# Patient Record
Sex: FEMALE | Race: BLACK OR AFRICAN AMERICAN | NOT HISPANIC OR LATINO | URBAN - METROPOLITAN AREA
[De-identification: names, ages, dates, MRNs, and addresses within clinical notes are randomized per-mention and may not be internally consistent; named-entity substitution may affect disease eponyms.]

---

## 2017-04-28 ENCOUNTER — INPATIENT (INPATIENT)
Facility: HOSPITAL | Age: 52
LOS: 4 days | Discharge: HOME | DRG: 203 | End: 2017-05-03
Attending: INTERNAL MEDICINE | Admitting: INTERNAL MEDICINE
Payer: MEDICARE

## 2017-04-28 VITALS
WEIGHT: 199.96 LBS | HEIGHT: 67 IN | RESPIRATION RATE: 25 BRPM | OXYGEN SATURATION: 100 % | SYSTOLIC BLOOD PRESSURE: 122 MMHG | HEART RATE: 90 BPM | DIASTOLIC BLOOD PRESSURE: 76 MMHG

## 2017-04-28 DIAGNOSIS — K52.9 NONINFECTIVE GASTROENTERITIS AND COLITIS, UNSPECIFIED: ICD-10-CM

## 2017-04-28 DIAGNOSIS — Z90.49 ACQUIRED ABSENCE OF OTHER SPECIFIED PARTS OF DIGESTIVE TRACT: Chronic | ICD-10-CM

## 2017-04-28 DIAGNOSIS — E87.6 HYPOKALEMIA: ICD-10-CM

## 2017-04-28 DIAGNOSIS — Z96.641 PRESENCE OF RIGHT ARTIFICIAL HIP JOINT: Chronic | ICD-10-CM

## 2017-04-28 DIAGNOSIS — Z29.9 ENCOUNTER FOR PROPHYLACTIC MEASURES, UNSPECIFIED: ICD-10-CM

## 2017-04-28 DIAGNOSIS — Z90.711 ACQUIRED ABSENCE OF UTERUS WITH REMAINING CERVICAL STUMP: Chronic | ICD-10-CM

## 2017-04-28 DIAGNOSIS — G89.29 OTHER CHRONIC PAIN: ICD-10-CM

## 2017-04-28 DIAGNOSIS — J45.51 SEVERE PERSISTENT ASTHMA WITH (ACUTE) EXACERBATION: ICD-10-CM

## 2017-04-28 DIAGNOSIS — M79.669 PAIN IN UNSPECIFIED LOWER LEG: ICD-10-CM

## 2017-04-28 LAB
ALBUMIN SERPL ELPH-MCNC: 3.6 G/DL — SIGNIFICANT CHANGE UP (ref 3.5–5)
ALP SERPL-CCNC: 132 U/L — HIGH (ref 40–120)
ALT FLD-CCNC: 14 U/L DA — SIGNIFICANT CHANGE UP (ref 10–60)
ANION GAP SERPL CALC-SCNC: 12 MMOL/L — SIGNIFICANT CHANGE UP (ref 5–17)
ANION GAP SERPL CALC-SCNC: 7 MMOL/L — SIGNIFICANT CHANGE UP (ref 5–17)
AST SERPL-CCNC: 11 U/L — SIGNIFICANT CHANGE UP (ref 10–40)
BASE EXCESS BLDA CALC-SCNC: -3.8 MMOL/L — LOW (ref -2–2)
BASOPHILS # BLD AUTO: 0.1 K/UL — SIGNIFICANT CHANGE UP (ref 0–0.2)
BASOPHILS NFR BLD AUTO: 1.1 % — SIGNIFICANT CHANGE UP (ref 0–2)
BILIRUB SERPL-MCNC: 1.1 MG/DL — SIGNIFICANT CHANGE UP (ref 0.2–1.2)
BLOOD GAS COMMENTS ARTERIAL: SIGNIFICANT CHANGE UP
BUN SERPL-MCNC: 10 MG/DL — SIGNIFICANT CHANGE UP (ref 7–18)
BUN SERPL-MCNC: 13 MG/DL — SIGNIFICANT CHANGE UP (ref 7–18)
CALCIUM SERPL-MCNC: 9 MG/DL — SIGNIFICANT CHANGE UP (ref 8.4–10.5)
CALCIUM SERPL-MCNC: 9.2 MG/DL — SIGNIFICANT CHANGE UP (ref 8.4–10.5)
CHLORIDE SERPL-SCNC: 108 MMOL/L — SIGNIFICANT CHANGE UP (ref 96–108)
CHLORIDE SERPL-SCNC: 111 MMOL/L — HIGH (ref 96–108)
CO2 SERPL-SCNC: 23 MMOL/L — SIGNIFICANT CHANGE UP (ref 22–31)
CO2 SERPL-SCNC: 23 MMOL/L — SIGNIFICANT CHANGE UP (ref 22–31)
CREAT SERPL-MCNC: 1.01 MG/DL — SIGNIFICANT CHANGE UP (ref 0.5–1.3)
CREAT SERPL-MCNC: 1.08 MG/DL — SIGNIFICANT CHANGE UP (ref 0.5–1.3)
EOSINOPHIL # BLD AUTO: 0.1 K/UL — SIGNIFICANT CHANGE UP (ref 0–0.5)
EOSINOPHIL NFR BLD AUTO: 1.9 % — SIGNIFICANT CHANGE UP (ref 0–6)
GLUCOSE SERPL-MCNC: 137 MG/DL — HIGH (ref 70–99)
GLUCOSE SERPL-MCNC: 151 MG/DL — HIGH (ref 70–99)
HCO3 BLDA-SCNC: 19 MMOL/L — LOW (ref 23–27)
HCT VFR BLD CALC: 38.3 % — SIGNIFICANT CHANGE UP (ref 34.5–45)
HGB BLD-MCNC: 12.8 G/DL — SIGNIFICANT CHANGE UP (ref 11.5–15.5)
HOROWITZ INDEX BLDA+IHG-RTO: 30 — SIGNIFICANT CHANGE UP
LYMPHOCYTES # BLD AUTO: 4.2 K/UL — HIGH (ref 1–3.3)
LYMPHOCYTES # BLD AUTO: 64.6 % — HIGH (ref 13–44)
MCHC RBC-ENTMCNC: 30.7 PG — SIGNIFICANT CHANGE UP (ref 27–34)
MCHC RBC-ENTMCNC: 33.5 GM/DL — SIGNIFICANT CHANGE UP (ref 32–36)
MCV RBC AUTO: 91.6 FL — SIGNIFICANT CHANGE UP (ref 80–100)
MONOCYTES # BLD AUTO: 0.4 K/UL — SIGNIFICANT CHANGE UP (ref 0–0.9)
MONOCYTES NFR BLD AUTO: 5.7 % — SIGNIFICANT CHANGE UP (ref 2–14)
NEUTROPHILS # BLD AUTO: 1.7 K/UL — LOW (ref 1.8–7.4)
NEUTROPHILS NFR BLD AUTO: 26.6 % — LOW (ref 43–77)
PCO2 BLDA: 28 MMHG — LOW (ref 32–46)
PH BLDA: 7.44 — SIGNIFICANT CHANGE UP (ref 7.35–7.45)
PLATELET # BLD AUTO: 330 K/UL — SIGNIFICANT CHANGE UP (ref 150–400)
PO2 BLDA: 249 MMHG — HIGH (ref 74–108)
POTASSIUM SERPL-MCNC: 2.6 MMOL/L — CRITICAL LOW (ref 3.5–5.3)
POTASSIUM SERPL-MCNC: 4.8 MMOL/L — SIGNIFICANT CHANGE UP (ref 3.5–5.3)
POTASSIUM SERPL-SCNC: 2.6 MMOL/L — CRITICAL LOW (ref 3.5–5.3)
POTASSIUM SERPL-SCNC: 4.8 MMOL/L — SIGNIFICANT CHANGE UP (ref 3.5–5.3)
PROT SERPL-MCNC: 7.3 G/DL — SIGNIFICANT CHANGE UP (ref 6–8.3)
RAPID RVP RESULT: SIGNIFICANT CHANGE UP
RBC # BLD: 4.18 M/UL — SIGNIFICANT CHANGE UP (ref 3.8–5.2)
RBC # FLD: 12.4 % — SIGNIFICANT CHANGE UP (ref 10.3–14.5)
SAO2 % BLDA: 100 % — HIGH (ref 92–96)
SODIUM SERPL-SCNC: 141 MMOL/L — SIGNIFICANT CHANGE UP (ref 135–145)
SODIUM SERPL-SCNC: 143 MMOL/L — SIGNIFICANT CHANGE UP (ref 135–145)
WBC # BLD: 6.5 K/UL — SIGNIFICANT CHANGE UP (ref 3.8–10.5)
WBC # FLD AUTO: 6.5 K/UL — SIGNIFICANT CHANGE UP (ref 3.8–10.5)

## 2017-04-28 PROCEDURE — 99291 CRITICAL CARE FIRST HOUR: CPT

## 2017-04-28 PROCEDURE — 71010: CPT | Mod: 26

## 2017-04-28 RX ORDER — IPRATROPIUM/ALBUTEROL SULFATE 18-103MCG
3 AEROSOL WITH ADAPTER (GRAM) INHALATION EVERY 6 HOURS
Qty: 0 | Refills: 0 | Status: DISCONTINUED | OUTPATIENT
Start: 2017-04-28 | End: 2017-04-30

## 2017-04-28 RX ORDER — SODIUM CHLORIDE 9 MG/ML
1000 INJECTION INTRAMUSCULAR; INTRAVENOUS; SUBCUTANEOUS
Qty: 0 | Refills: 0 | Status: DISCONTINUED | OUTPATIENT
Start: 2017-04-28 | End: 2017-04-29

## 2017-04-28 RX ORDER — LORATADINE 10 MG/1
10 TABLET ORAL DAILY
Qty: 0 | Refills: 0 | Status: DISCONTINUED | OUTPATIENT
Start: 2017-04-28 | End: 2017-05-03

## 2017-04-28 RX ORDER — FLUTICASONE PROPIONATE 50 MCG
1 SPRAY, SUSPENSION NASAL
Qty: 0 | Refills: 0 | Status: DISCONTINUED | OUTPATIENT
Start: 2017-04-28 | End: 2017-05-03

## 2017-04-28 RX ORDER — ALBUTEROL 90 UG/1
2.5 AEROSOL, METERED ORAL ONCE
Qty: 0 | Refills: 0 | Status: COMPLETED | OUTPATIENT
Start: 2017-04-28 | End: 2017-04-28

## 2017-04-28 RX ORDER — BUDESONIDE AND FORMOTEROL FUMARATE DIHYDRATE 160; 4.5 UG/1; UG/1
1 AEROSOL RESPIRATORY (INHALATION)
Qty: 0 | Refills: 0 | Status: DISCONTINUED | OUTPATIENT
Start: 2017-04-28 | End: 2017-05-03

## 2017-04-28 RX ORDER — ACETAMINOPHEN 500 MG
650 TABLET ORAL EVERY 6 HOURS
Qty: 0 | Refills: 0 | Status: DISCONTINUED | OUTPATIENT
Start: 2017-04-28 | End: 2017-05-03

## 2017-04-28 RX ORDER — GABAPENTIN 400 MG/1
200 CAPSULE ORAL THREE TIMES A DAY
Qty: 0 | Refills: 0 | Status: DISCONTINUED | OUTPATIENT
Start: 2017-04-28 | End: 2017-05-03

## 2017-04-28 RX ORDER — TIOTROPIUM BROMIDE 18 UG/1
1 CAPSULE ORAL; RESPIRATORY (INHALATION) DAILY
Qty: 0 | Refills: 0 | Status: DISCONTINUED | OUTPATIENT
Start: 2017-04-28 | End: 2017-05-03

## 2017-04-28 RX ORDER — MAGNESIUM SULFATE 500 MG/ML
2 VIAL (ML) INJECTION ONCE
Qty: 0 | Refills: 0 | Status: COMPLETED | OUTPATIENT
Start: 2017-04-28 | End: 2017-04-28

## 2017-04-28 RX ORDER — MONTELUKAST 4 MG/1
10 TABLET, CHEWABLE ORAL DAILY
Qty: 0 | Refills: 0 | Status: DISCONTINUED | OUTPATIENT
Start: 2017-04-28 | End: 2017-05-02

## 2017-04-28 RX ORDER — POTASSIUM CHLORIDE 20 MEQ
40 PACKET (EA) ORAL EVERY 4 HOURS
Qty: 0 | Refills: 0 | Status: COMPLETED | OUTPATIENT
Start: 2017-04-28 | End: 2017-04-28

## 2017-04-28 RX ORDER — KETAMINE HYDROCHLORIDE 100 MG/ML
80 INJECTION INTRAMUSCULAR; INTRAVENOUS ONCE
Qty: 0 | Refills: 0 | Status: DISCONTINUED | OUTPATIENT
Start: 2017-04-28 | End: 2017-04-28

## 2017-04-28 RX ORDER — POTASSIUM CHLORIDE 20 MEQ
10 PACKET (EA) ORAL
Qty: 0 | Refills: 0 | Status: COMPLETED | OUTPATIENT
Start: 2017-04-28 | End: 2017-04-28

## 2017-04-28 RX ORDER — ENOXAPARIN SODIUM 100 MG/ML
40 INJECTION SUBCUTANEOUS EVERY 24 HOURS
Qty: 0 | Refills: 0 | Status: DISCONTINUED | OUTPATIENT
Start: 2017-04-29 | End: 2017-05-03

## 2017-04-28 RX ORDER — ALBUTEROL 90 UG/1
1 AEROSOL, METERED ORAL EVERY 4 HOURS
Qty: 0 | Refills: 0 | Status: DISCONTINUED | OUTPATIENT
Start: 2017-04-28 | End: 2017-05-03

## 2017-04-28 RX ORDER — METHOCARBAMOL 500 MG/1
750 TABLET, FILM COATED ORAL THREE TIMES A DAY
Qty: 0 | Refills: 0 | Status: DISCONTINUED | OUTPATIENT
Start: 2017-04-28 | End: 2017-05-03

## 2017-04-28 RX ORDER — ONDANSETRON 8 MG/1
4 TABLET, FILM COATED ORAL EVERY 4 HOURS
Qty: 0 | Refills: 0 | Status: COMPLETED | OUTPATIENT
Start: 2017-04-28 | End: 2017-04-30

## 2017-04-28 RX ADMIN — KETAMINE HYDROCHLORIDE 80 MILLIGRAM(S): 100 INJECTION INTRAMUSCULAR; INTRAVENOUS at 13:13

## 2017-04-28 RX ADMIN — GABAPENTIN 200 MILLIGRAM(S): 400 CAPSULE ORAL at 21:58

## 2017-04-28 RX ADMIN — MONTELUKAST 10 MILLIGRAM(S): 4 TABLET, CHEWABLE ORAL at 16:01

## 2017-04-28 RX ADMIN — ALBUTEROL 2.5 MILLIGRAM(S): 90 AEROSOL, METERED ORAL at 13:01

## 2017-04-28 RX ADMIN — ALBUTEROL 2.5 MILLIGRAM(S): 90 AEROSOL, METERED ORAL at 13:06

## 2017-04-28 RX ADMIN — Medication 100 MILLIEQUIVALENT(S): at 17:00

## 2017-04-28 RX ADMIN — Medication 40 MILLIEQUIVALENT(S): at 17:39

## 2017-04-28 RX ADMIN — Medication 50 GRAM(S): at 13:00

## 2017-04-28 RX ADMIN — ALBUTEROL 2.5 MILLIGRAM(S): 90 AEROSOL, METERED ORAL at 13:13

## 2017-04-28 RX ADMIN — Medication 40 MILLIGRAM(S): at 21:58

## 2017-04-28 RX ADMIN — Medication 100 MILLIEQUIVALENT(S): at 18:00

## 2017-04-28 RX ADMIN — Medication 40 MILLIGRAM(S): at 14:05

## 2017-04-28 RX ADMIN — LORATADINE 10 MILLIGRAM(S): 10 TABLET ORAL at 17:39

## 2017-04-28 RX ADMIN — Medication 40 MILLIEQUIVALENT(S): at 21:58

## 2017-04-28 RX ADMIN — Medication 100 MILLIEQUIVALENT(S): at 16:00

## 2017-04-28 RX ADMIN — SODIUM CHLORIDE 75 MILLILITER(S): 9 INJECTION INTRAMUSCULAR; INTRAVENOUS; SUBCUTANEOUS at 17:40

## 2017-04-28 RX ADMIN — Medication 3 MILLILITER(S): at 21:48

## 2017-04-28 NOTE — ED ADULT NURSE NOTE - OBJECTIVE STATEMENT
Presented to ED for "asthma attack that started 20 minutes ago." AA&Ox3. Received on %. Received duoneb x3 from EMS. On cardiac monitor. IVHL gauge 18 placed by EMS to right AC.

## 2017-04-28 NOTE — H&P ADULT. - PROBLEM SELECTOR PLAN 1
- secondary to seasonal allergy   - s/p Duonebs*3, ketamine 80 once, mag sulphate 2mg iv once and BiPAP with I/E 12/6. Patient's peak flow at exam was 430ml. Her baseline is around 300ml.   - will continue Duonebs, solumedrol, Symbicort and Singulair   - patient saturating 99% with 2l NC; will continue for now, may taper as indicated  - f/u RVP  - will get blood cultures   - out patient PFTs indicate that patient developed stridor after methacholine inhalation during fifth stage of test and was subsequently transferred to ED. Otherwise, PFTs and flow cytometry is normal; most likely diagnosis is vocal cord dysfunction. - secondary to seasonal allergy   - s/p Duonebs*3, ketamine 80 once, mag sulphate 2mg iv once and BiPAP with I/E 12/6. Patient's peak flow at exam was 430ml. Her baseline is around 300ml.   - will admit to ICU and new Bipap was needed   - will continue Duonebs, solumedrol, Symbicort and Singulair   - patient saturating 99% with 2l NC; will continue for now, may taper as indicated  - f/u RVP  - will get blood cultures   - out patient PFTs indicate that patient developed stridor after methacholine inhalation during fifth stage of test and was subsequently transferred to ED. Otherwise, PFTs and flow cytometry is normal; most likely diagnosis is vocal cord dysfunction. - secondary to seasonal allergy   - s/p Duonebs*3, ketamine 80 once, mag sulphate 2mg iv once and BiPAP with I/E 12/6. ABG 7.44/28/249/19  - Patient's peak flow at exam was 430ml. Her baseline is around 300ml.   - will admit to ICU and new Bipap was needed   - will continue Duonebs, solumedrol, Symbicort and Singulair   - patient saturating 99% with 2l NC; will continue for now, may taper as indicated  - f/u RVP  - will get blood cultures   - out patient PFTs indicate that patient developed stridor after methacholine inhalation during fifth stage of test and was subsequently transferred to ED. Otherwise, PFTs and flow cytometry is normal; most likely diagnosis is vocal cord dysfunction.

## 2017-04-28 NOTE — H&P ADULT. - ASSESSMENT
51 year old female from home, with history of Asthma with multiple hospitalizations including intubation for 24 hours last year and Allergies, Chronic back pain, PSH of appendectomy, right hip surgery and hysterectomy, presented with acute onset of shortness of breath and wheezing.

## 2017-04-28 NOTE — H&P ADULT. - PSH
H/O abdominal supracervical subtotal hysterectomy    History of appendectomy    History of right hip replacement

## 2017-04-28 NOTE — ED PROVIDER NOTE - CRITICAL CARE INDICATION, MLM
patient was critically ill... Patient was critically ill with a high probability of imminent or life threatening deterioration. Patient unable to breath, required bipap and frequent bedside visits.

## 2017-04-28 NOTE — H&P ADULT. - ATTENDING COMMENTS
52 yo female with hx of asthma since childhood, presents with acute dyspnea, wheezing (per ED staff), acute respiratory failure requiring bipap.  On my exam she initially had an inspiratory wheeze mainly from the upper airway, no expiratory wheeze.  Will treat for asthma exacerbation with steroids, nebs, bipap, however I strongly suspect vocal cord dysfunction as the primary cause of her distress.

## 2017-04-28 NOTE — H&P ADULT. - MUSCULOSKELETAL
details… detailed exam calf tenderness/no joint swelling/no joint warmth/ROM intact/normal/no joint erythema

## 2017-04-28 NOTE — H&P ADULT. - HISTORY OF PRESENT ILLNESS
51 year old female with history of asthma with prior intubation, right hip replacement, appendectomy, hysterectomy, multiple food and drug allergies, presented to the ED with acute respiratory distress, severe inspiratory and expiratory wheezing per the ED attending.  She reports for past 2 days her asthma has been "acting up" with a low grade fever and URI symptoms 2 days ago.  Today while at a doctors appointment at her orthopedist she became acutely short of breath, wheezing, and was sent to the ED. 51 year old female with history of asthma with prior intubation, right hip replacement, appendectomy, hysterectomy, multiple food and drug allergies, presented to the ED with acute respiratory distress, severe inspiratory and expiratory wheezing per the ED attending.  She reports for past 2 days her asthma has been "acting up" with a low grade fever and URI symptoms 2 days ago.  Symptoms also accompanied by cough and mild sputum production.  Today while at a doctors appointment at her orthopedist she became acutely short of breath, wheezing, and was sent to the ED.  En route she was given solu-medrol, nebs, SC epi.  Upon arrival she was given continuous albuterol nebs, magnesium, ketamine, and placed on BIPAP.   Pt now reports feeling better. 51 year old female from home, with history of Asthma with multiple hospitalizations including intubation for 24 hours last year and Allergies, Chronic back pain, PSH of appendectomy, right hip surgery and hysterectomy, presented with acute onset of shortness of breath and wheezing.     Patient had moderate persistent asthma since age 10 years requiring 1-2 puffs of inhalors day and no night time symptoms. 2 years ago she was admitted for anaphylactic shock and discovered to have several allergies to meat, nuts, eggs, shell fish, etc. Also her Asthma became severe persistent with frequent night time syptoms and multiple hospital admissions. She was placed on and off on iv steroids.Last year intubation at [facility] was difficult as her throat was closing and she was extubated next day, was told she cannot be intubated again should have tracheostomy if I  V is needed.      ppendectomy, hysterectomy, multiple food and drug allergies, presented to the ED with acute respiratory distress, severe inspiratory and expiratory wheezing per the ED attending.  She reports for past 2 days her asthma has been "acting up" with a low grade fever and URI symptoms 2 days ago.  Symptoms also accompanied by cough and mild sputum production.  Today while at a doctors appointment at her orthopedist she became acutely short of breath, wheezing, and was sent to the ED.  En route she was given solu-medrol, nebs, SC epi.  Upon arrival she was given continuous albuterol nebs, magnesium, ketamine, and placed on BIPAP.   Pt now reports feeling better. 51 year old female from home, with history of Asthma with multiple hospitalizations including intubation for 24 hours last year and Allergies, Chronic back pain, PSH of appendectomy, right hip surgery and hysterectomy, presented with acute onset of shortness of breath and wheezing.     Patient had moderate persistent asthma since age 10 years requiring 1-2 puffs of inhalors day and no night time symptoms. 2 years ago she was admitted for anaphylactic shock and discovered to have several allergies to meat, nuts, eggs, shell fish, etc. Also her Asthma became severe persistent with frequent night time syptoms and multiple hospital admissions. She was placed on and off on iv steroids.Last year intubation at [facility] was difficult as her throat was closing and she was extubated next day, was told she cannot be intubated again should have tracheostomy if Invasive ventilation is needed. 2 months ago had PFTs at [facility] and found to have an "upper airway condition' something in throat'. She was tapered off steroids about 2 months ago and inhaled steriods was recommended. Despite PMD's efforts in getting insurance to pay, she couldn't get the same and has been on inhalors and nebulisers only.  2 days ago she noticed windows of her room were open and she could smell fresh cut grass that initiated the asthma attack. Also has cough, shortness of breath wheeze, sputum (clear), feeling warm/no fever, headache, nasal congestion and left sided pleuritic chest pain. She was at her orthopedist's office from where she was sent to ED for SOB and wheeze.   She was having "greenish stools" for which PMD told her to take low fibre. Since yesterday she has been having nausea, vomiting and diarrhea. Describes vomitus as anything that she eats and sometimes "clear color with streaks of blood". She had 3 watery BMs yesterday. Denies any abdominal pain, weight loss, recent sick contact/travel or urinary problems. Due to allergies she cannot get Flu shot.   Lifetime non smoker, no passive smoking.      In the ED, she was given continuous albuterol nebs, magnesium, ketamine, and placed on BIPAP with significant improvement of symptoms.

## 2017-04-28 NOTE — H&P ADULT. - PROBLEM SELECTOR PLAN 2
- most likley viral gastroenteritis  - she received only on antibiotic 2 months ago during a hospital visit; no abdominal tenderness hence will not get order C diff   - iv fluids and electrolyte replacement   -continue Zofran as needed - most likely viral gastroenteritis  - she received only on antibiotic 2 months ago during a hospital visit; no abdominal tenderness hence will not get order C diff   - iv fluids and electrolyte replacement   -continue Zofran as needed

## 2017-04-28 NOTE — H&P ADULT. - PROBLEM SELECTOR PLAN 5
- IMPROVE VTE score is 0   - DVT ppx as patient might be bedridden during hospital stay - patient reports taking only TYlenol and motrin as needed for pain   - will hold Motrin for recent blood streaked sputum   - will continue methocarbamol and gabapentin   - will continue Tylenol

## 2017-04-28 NOTE — H&P ADULT. - PROBLEM SELECTOR PLAN 4
- right calf tenderenss   - Nathanael's sign neg  - Wells score for DVT 1; low/unlikley hence will not order further tests - right calf tenderness   - Nathanael's sign neg  - Wells score for DVT 1; low/unlikley hence will not order further tests

## 2017-04-29 LAB
ALBUMIN SERPL ELPH-MCNC: 3.3 G/DL — LOW (ref 3.5–5)
ALP SERPL-CCNC: 119 U/L — SIGNIFICANT CHANGE UP (ref 40–120)
ALT FLD-CCNC: 14 U/L DA — SIGNIFICANT CHANGE UP (ref 10–60)
ANION GAP SERPL CALC-SCNC: 6 MMOL/L — SIGNIFICANT CHANGE UP (ref 5–17)
AST SERPL-CCNC: 8 U/L — LOW (ref 10–40)
BASOPHILS # BLD AUTO: 0 K/UL — SIGNIFICANT CHANGE UP (ref 0–0.2)
BASOPHILS NFR BLD AUTO: 0.2 % — SIGNIFICANT CHANGE UP (ref 0–2)
BILIRUB SERPL-MCNC: 0.6 MG/DL — SIGNIFICANT CHANGE UP (ref 0.2–1.2)
BUN SERPL-MCNC: 10 MG/DL — SIGNIFICANT CHANGE UP (ref 7–18)
CALCIUM SERPL-MCNC: 9.3 MG/DL — SIGNIFICANT CHANGE UP (ref 8.4–10.5)
CHLORIDE SERPL-SCNC: 110 MMOL/L — HIGH (ref 96–108)
CO2 SERPL-SCNC: 25 MMOL/L — SIGNIFICANT CHANGE UP (ref 22–31)
CREAT SERPL-MCNC: 0.83 MG/DL — SIGNIFICANT CHANGE UP (ref 0.5–1.3)
EOSINOPHIL # BLD AUTO: 0 K/UL — SIGNIFICANT CHANGE UP (ref 0–0.5)
EOSINOPHIL NFR BLD AUTO: 0 % — SIGNIFICANT CHANGE UP (ref 0–6)
GLUCOSE SERPL-MCNC: 133 MG/DL — HIGH (ref 70–99)
HCT VFR BLD CALC: 36 % — SIGNIFICANT CHANGE UP (ref 34.5–45)
HGB BLD-MCNC: 11.7 G/DL — SIGNIFICANT CHANGE UP (ref 11.5–15.5)
LYMPHOCYTES # BLD AUTO: 1.5 K/UL — SIGNIFICANT CHANGE UP (ref 1–3.3)
LYMPHOCYTES # BLD AUTO: 15.8 % — SIGNIFICANT CHANGE UP (ref 13–44)
MAGNESIUM SERPL-MCNC: 2.4 MG/DL — SIGNIFICANT CHANGE UP (ref 1.8–2.4)
MCHC RBC-ENTMCNC: 30.4 PG — SIGNIFICANT CHANGE UP (ref 27–34)
MCHC RBC-ENTMCNC: 32.5 GM/DL — SIGNIFICANT CHANGE UP (ref 32–36)
MCV RBC AUTO: 93.3 FL — SIGNIFICANT CHANGE UP (ref 80–100)
MONOCYTES # BLD AUTO: 0.1 K/UL — SIGNIFICANT CHANGE UP (ref 0–0.9)
MONOCYTES NFR BLD AUTO: 0.9 % — LOW (ref 2–14)
NEUTROPHILS # BLD AUTO: 7.8 K/UL — HIGH (ref 1.8–7.4)
NEUTROPHILS NFR BLD AUTO: 83 % — HIGH (ref 43–77)
PHOSPHATE SERPL-MCNC: 3.4 MG/DL — SIGNIFICANT CHANGE UP (ref 2.5–4.5)
PLATELET # BLD AUTO: 320 K/UL — SIGNIFICANT CHANGE UP (ref 150–400)
POTASSIUM SERPL-MCNC: 4.9 MMOL/L — SIGNIFICANT CHANGE UP (ref 3.5–5.3)
POTASSIUM SERPL-SCNC: 4.9 MMOL/L — SIGNIFICANT CHANGE UP (ref 3.5–5.3)
PROT SERPL-MCNC: 7.2 G/DL — SIGNIFICANT CHANGE UP (ref 6–8.3)
RBC # BLD: 3.86 M/UL — SIGNIFICANT CHANGE UP (ref 3.8–5.2)
RBC # FLD: 12.8 % — SIGNIFICANT CHANGE UP (ref 10.3–14.5)
SODIUM SERPL-SCNC: 141 MMOL/L — SIGNIFICANT CHANGE UP (ref 135–145)
WBC # BLD: 9.4 K/UL — SIGNIFICANT CHANGE UP (ref 3.8–10.5)
WBC # FLD AUTO: 9.4 K/UL — SIGNIFICANT CHANGE UP (ref 3.8–10.5)

## 2017-04-29 RX ORDER — BENZOCAINE AND MENTHOL 5; 1 G/100ML; G/100ML
1 LIQUID ORAL THREE TIMES A DAY
Qty: 0 | Refills: 0 | Status: DISCONTINUED | OUTPATIENT
Start: 2017-04-29 | End: 2017-05-03

## 2017-04-29 RX ADMIN — BENZOCAINE AND MENTHOL 1 LOZENGE: 5; 1 LIQUID ORAL at 21:45

## 2017-04-29 RX ADMIN — Medication 40 MILLIGRAM(S): at 05:39

## 2017-04-29 RX ADMIN — BUDESONIDE AND FORMOTEROL FUMARATE DIHYDRATE 1 PUFF(S): 160; 4.5 AEROSOL RESPIRATORY (INHALATION) at 01:10

## 2017-04-29 RX ADMIN — Medication 40 MILLIGRAM(S): at 21:50

## 2017-04-29 RX ADMIN — ENOXAPARIN SODIUM 40 MILLIGRAM(S): 100 INJECTION SUBCUTANEOUS at 01:06

## 2017-04-29 RX ADMIN — Medication 1 SPRAY(S): at 19:05

## 2017-04-29 RX ADMIN — METHOCARBAMOL 750 MILLIGRAM(S): 500 TABLET, FILM COATED ORAL at 14:20

## 2017-04-29 RX ADMIN — GABAPENTIN 200 MILLIGRAM(S): 400 CAPSULE ORAL at 15:13

## 2017-04-29 RX ADMIN — Medication 40 MILLIGRAM(S): at 14:22

## 2017-04-29 RX ADMIN — Medication 3 MILLILITER(S): at 08:19

## 2017-04-29 RX ADMIN — Medication 3 MILLILITER(S): at 20:14

## 2017-04-29 RX ADMIN — MONTELUKAST 10 MILLIGRAM(S): 4 TABLET, CHEWABLE ORAL at 14:22

## 2017-04-29 RX ADMIN — METHOCARBAMOL 750 MILLIGRAM(S): 500 TABLET, FILM COATED ORAL at 05:38

## 2017-04-29 RX ADMIN — METHOCARBAMOL 750 MILLIGRAM(S): 500 TABLET, FILM COATED ORAL at 01:05

## 2017-04-29 RX ADMIN — Medication 1 SPRAY(S): at 05:39

## 2017-04-29 RX ADMIN — GABAPENTIN 200 MILLIGRAM(S): 400 CAPSULE ORAL at 05:38

## 2017-04-29 RX ADMIN — SODIUM CHLORIDE 75 MILLILITER(S): 9 INJECTION INTRAMUSCULAR; INTRAVENOUS; SUBCUTANEOUS at 01:10

## 2017-04-29 RX ADMIN — BUDESONIDE AND FORMOTEROL FUMARATE DIHYDRATE 1 PUFF(S): 160; 4.5 AEROSOL RESPIRATORY (INHALATION) at 21:46

## 2017-04-29 RX ADMIN — Medication 3 MILLILITER(S): at 14:38

## 2017-04-29 RX ADMIN — GABAPENTIN 200 MILLIGRAM(S): 400 CAPSULE ORAL at 21:45

## 2017-04-29 RX ADMIN — LORATADINE 10 MILLIGRAM(S): 10 TABLET ORAL at 15:09

## 2017-04-29 RX ADMIN — Medication 3 MILLILITER(S): at 03:01

## 2017-04-29 RX ADMIN — METHOCARBAMOL 750 MILLIGRAM(S): 500 TABLET, FILM COATED ORAL at 21:46

## 2017-04-29 RX ADMIN — BUDESONIDE AND FORMOTEROL FUMARATE DIHYDRATE 1 PUFF(S): 160; 4.5 AEROSOL RESPIRATORY (INHALATION) at 15:11

## 2017-04-30 LAB
ALBUMIN SERPL ELPH-MCNC: 3.3 G/DL — LOW (ref 3.5–5)
ALP SERPL-CCNC: 112 U/L — SIGNIFICANT CHANGE UP (ref 40–120)
ALT FLD-CCNC: 14 U/L DA — SIGNIFICANT CHANGE UP (ref 10–60)
ANION GAP SERPL CALC-SCNC: 5 MMOL/L — SIGNIFICANT CHANGE UP (ref 5–17)
AST SERPL-CCNC: 9 U/L — LOW (ref 10–40)
BILIRUB SERPL-MCNC: 0.5 MG/DL — SIGNIFICANT CHANGE UP (ref 0.2–1.2)
BUN SERPL-MCNC: 17 MG/DL — SIGNIFICANT CHANGE UP (ref 7–18)
CALCIUM SERPL-MCNC: 9.1 MG/DL — SIGNIFICANT CHANGE UP (ref 8.4–10.5)
CHLORIDE SERPL-SCNC: 109 MMOL/L — HIGH (ref 96–108)
CO2 SERPL-SCNC: 27 MMOL/L — SIGNIFICANT CHANGE UP (ref 22–31)
CREAT SERPL-MCNC: 0.94 MG/DL — SIGNIFICANT CHANGE UP (ref 0.5–1.3)
GLUCOSE SERPL-MCNC: 129 MG/DL — HIGH (ref 70–99)
HCT VFR BLD CALC: 33.5 % — LOW (ref 34.5–45)
HGB BLD-MCNC: 11.3 G/DL — LOW (ref 11.5–15.5)
IGE SERPL-ACNC: 508 IU/ML — HIGH (ref 0–100)
MAGNESIUM SERPL-MCNC: 2.4 MG/DL — SIGNIFICANT CHANGE UP (ref 1.8–2.4)
MCHC RBC-ENTMCNC: 31.3 PG — SIGNIFICANT CHANGE UP (ref 27–34)
MCHC RBC-ENTMCNC: 33.7 GM/DL — SIGNIFICANT CHANGE UP (ref 32–36)
MCV RBC AUTO: 93 FL — SIGNIFICANT CHANGE UP (ref 80–100)
PHOSPHATE SERPL-MCNC: 3.6 MG/DL — SIGNIFICANT CHANGE UP (ref 2.5–4.5)
PLATELET # BLD AUTO: 272 K/UL — SIGNIFICANT CHANGE UP (ref 150–400)
POTASSIUM SERPL-MCNC: 5 MMOL/L — SIGNIFICANT CHANGE UP (ref 3.5–5.3)
POTASSIUM SERPL-SCNC: 5 MMOL/L — SIGNIFICANT CHANGE UP (ref 3.5–5.3)
PROT SERPL-MCNC: 7.1 G/DL — SIGNIFICANT CHANGE UP (ref 6–8.3)
RBC # BLD: 3.6 M/UL — LOW (ref 3.8–5.2)
RBC # FLD: 13.3 % — SIGNIFICANT CHANGE UP (ref 10.3–14.5)
SODIUM SERPL-SCNC: 141 MMOL/L — SIGNIFICANT CHANGE UP (ref 135–145)
WBC # BLD: 11.4 K/UL — HIGH (ref 3.8–10.5)
WBC # FLD AUTO: 11.4 K/UL — HIGH (ref 3.8–10.5)

## 2017-04-30 PROCEDURE — 71010: CPT | Mod: 26

## 2017-04-30 PROCEDURE — 99232 SBSQ HOSP IP/OBS MODERATE 35: CPT | Mod: GC

## 2017-04-30 RX ORDER — IPRATROPIUM/ALBUTEROL SULFATE 18-103MCG
3 AEROSOL WITH ADAPTER (GRAM) INHALATION ONCE
Qty: 0 | Refills: 0 | Status: COMPLETED | OUTPATIENT
Start: 2017-04-30 | End: 2017-04-30

## 2017-04-30 RX ORDER — MAGNESIUM SULFATE 500 MG/ML
2 VIAL (ML) INJECTION ONCE
Qty: 0 | Refills: 0 | Status: COMPLETED | OUTPATIENT
Start: 2017-04-30 | End: 2017-04-30

## 2017-04-30 RX ORDER — IPRATROPIUM/ALBUTEROL SULFATE 18-103MCG
3 AEROSOL WITH ADAPTER (GRAM) INHALATION EVERY 4 HOURS
Qty: 0 | Refills: 0 | Status: DISCONTINUED | OUTPATIENT
Start: 2017-04-30 | End: 2017-05-03

## 2017-04-30 RX ADMIN — BENZOCAINE AND MENTHOL 1 LOZENGE: 5; 1 LIQUID ORAL at 12:52

## 2017-04-30 RX ADMIN — Medication 40 MILLIGRAM(S): at 18:56

## 2017-04-30 RX ADMIN — LORATADINE 10 MILLIGRAM(S): 10 TABLET ORAL at 12:52

## 2017-04-30 RX ADMIN — GABAPENTIN 200 MILLIGRAM(S): 400 CAPSULE ORAL at 12:52

## 2017-04-30 RX ADMIN — Medication 3 MILLILITER(S): at 21:03

## 2017-04-30 RX ADMIN — Medication 3 MILLILITER(S): at 17:45

## 2017-04-30 RX ADMIN — Medication 1 SPRAY(S): at 18:57

## 2017-04-30 RX ADMIN — Medication 40 MILLIGRAM(S): at 12:53

## 2017-04-30 RX ADMIN — METHOCARBAMOL 750 MILLIGRAM(S): 500 TABLET, FILM COATED ORAL at 12:53

## 2017-04-30 RX ADMIN — Medication 3 MILLILITER(S): at 02:15

## 2017-04-30 RX ADMIN — METHOCARBAMOL 750 MILLIGRAM(S): 500 TABLET, FILM COATED ORAL at 06:33

## 2017-04-30 RX ADMIN — Medication 1 SPRAY(S): at 06:32

## 2017-04-30 RX ADMIN — BENZOCAINE AND MENTHOL 1 LOZENGE: 5; 1 LIQUID ORAL at 06:35

## 2017-04-30 RX ADMIN — BUDESONIDE AND FORMOTEROL FUMARATE DIHYDRATE 1 PUFF(S): 160; 4.5 AEROSOL RESPIRATORY (INHALATION) at 22:27

## 2017-04-30 RX ADMIN — ENOXAPARIN SODIUM 40 MILLIGRAM(S): 100 INJECTION SUBCUTANEOUS at 00:13

## 2017-04-30 RX ADMIN — Medication 3 MILLILITER(S): at 08:39

## 2017-04-30 RX ADMIN — Medication 3 MILLILITER(S): at 08:38

## 2017-04-30 RX ADMIN — MONTELUKAST 10 MILLIGRAM(S): 4 TABLET, CHEWABLE ORAL at 12:52

## 2017-04-30 RX ADMIN — ONDANSETRON 4 MILLIGRAM(S): 8 TABLET, FILM COATED ORAL at 07:14

## 2017-04-30 RX ADMIN — Medication 40 MILLIGRAM(S): at 06:32

## 2017-04-30 RX ADMIN — GABAPENTIN 200 MILLIGRAM(S): 400 CAPSULE ORAL at 06:33

## 2017-04-30 RX ADMIN — Medication 3 MILLILITER(S): at 14:09

## 2017-04-30 RX ADMIN — BUDESONIDE AND FORMOTEROL FUMARATE DIHYDRATE 1 PUFF(S): 160; 4.5 AEROSOL RESPIRATORY (INHALATION) at 09:50

## 2017-05-01 LAB
ALBUMIN SERPL ELPH-MCNC: 3.5 G/DL — SIGNIFICANT CHANGE UP (ref 3.5–5)
ALP SERPL-CCNC: 104 U/L — SIGNIFICANT CHANGE UP (ref 40–120)
ALT FLD-CCNC: 17 U/L DA — SIGNIFICANT CHANGE UP (ref 10–60)
ANION GAP SERPL CALC-SCNC: 7 MMOL/L — SIGNIFICANT CHANGE UP (ref 5–17)
AST SERPL-CCNC: 8 U/L — LOW (ref 10–40)
BILIRUB SERPL-MCNC: 0.4 MG/DL — SIGNIFICANT CHANGE UP (ref 0.2–1.2)
BUN SERPL-MCNC: 18 MG/DL — SIGNIFICANT CHANGE UP (ref 7–18)
CALCIUM SERPL-MCNC: 9 MG/DL — SIGNIFICANT CHANGE UP (ref 8.4–10.5)
CHLORIDE SERPL-SCNC: 104 MMOL/L — SIGNIFICANT CHANGE UP (ref 96–108)
CO2 SERPL-SCNC: 30 MMOL/L — SIGNIFICANT CHANGE UP (ref 22–31)
CREAT SERPL-MCNC: 1.09 MG/DL — SIGNIFICANT CHANGE UP (ref 0.5–1.3)
GLUCOSE SERPL-MCNC: 89 MG/DL — SIGNIFICANT CHANGE UP (ref 70–99)
HCT VFR BLD CALC: 37.1 % — SIGNIFICANT CHANGE UP (ref 34.5–45)
HGB BLD-MCNC: 11.6 G/DL — SIGNIFICANT CHANGE UP (ref 11.5–15.5)
MAGNESIUM SERPL-MCNC: 2.5 MG/DL — HIGH (ref 1.8–2.4)
MCHC RBC-ENTMCNC: 30.3 PG — SIGNIFICANT CHANGE UP (ref 27–34)
MCHC RBC-ENTMCNC: 31.4 GM/DL — LOW (ref 32–36)
MCV RBC AUTO: 96.6 FL — SIGNIFICANT CHANGE UP (ref 80–100)
PHOSPHATE SERPL-MCNC: 3.5 MG/DL — SIGNIFICANT CHANGE UP (ref 2.5–4.5)
PLATELET # BLD AUTO: 281 K/UL — SIGNIFICANT CHANGE UP (ref 150–400)
POTASSIUM SERPL-MCNC: 4.2 MMOL/L — SIGNIFICANT CHANGE UP (ref 3.5–5.3)
POTASSIUM SERPL-SCNC: 4.2 MMOL/L — SIGNIFICANT CHANGE UP (ref 3.5–5.3)
PROT SERPL-MCNC: 7.4 G/DL — SIGNIFICANT CHANGE UP (ref 6–8.3)
RBC # BLD: 3.84 M/UL — SIGNIFICANT CHANGE UP (ref 3.8–5.2)
RBC # FLD: 13.4 % — SIGNIFICANT CHANGE UP (ref 10.3–14.5)
SODIUM SERPL-SCNC: 141 MMOL/L — SIGNIFICANT CHANGE UP (ref 135–145)
WBC # BLD: 11 K/UL — HIGH (ref 3.8–10.5)
WBC # FLD AUTO: 11 K/UL — HIGH (ref 3.8–10.5)

## 2017-05-01 PROCEDURE — 99232 SBSQ HOSP IP/OBS MODERATE 35: CPT | Mod: GC

## 2017-05-01 RX ORDER — PANTOPRAZOLE SODIUM 20 MG/1
40 TABLET, DELAYED RELEASE ORAL
Qty: 0 | Refills: 0 | Status: DISCONTINUED | OUTPATIENT
Start: 2017-05-01 | End: 2017-05-03

## 2017-05-01 RX ORDER — SENNA PLUS 8.6 MG/1
2 TABLET ORAL AT BEDTIME
Qty: 0 | Refills: 0 | Status: DISCONTINUED | OUTPATIENT
Start: 2017-05-01 | End: 2017-05-03

## 2017-05-01 RX ORDER — DOCUSATE SODIUM 100 MG
100 CAPSULE ORAL
Qty: 0 | Refills: 0 | Status: DISCONTINUED | OUTPATIENT
Start: 2017-05-01 | End: 2017-05-03

## 2017-05-01 RX ADMIN — Medication 40 MILLIGRAM(S): at 06:23

## 2017-05-01 RX ADMIN — Medication 3 MILLILITER(S): at 14:06

## 2017-05-01 RX ADMIN — Medication 3 MILLILITER(S): at 09:05

## 2017-05-01 RX ADMIN — ENOXAPARIN SODIUM 40 MILLIGRAM(S): 100 INJECTION SUBCUTANEOUS at 00:27

## 2017-05-01 RX ADMIN — BENZOCAINE AND MENTHOL 1 LOZENGE: 5; 1 LIQUID ORAL at 12:54

## 2017-05-01 RX ADMIN — Medication 1 SPRAY(S): at 18:38

## 2017-05-01 RX ADMIN — MONTELUKAST 10 MILLIGRAM(S): 4 TABLET, CHEWABLE ORAL at 12:52

## 2017-05-01 RX ADMIN — GABAPENTIN 200 MILLIGRAM(S): 400 CAPSULE ORAL at 12:51

## 2017-05-01 RX ADMIN — Medication 3 MILLILITER(S): at 17:42

## 2017-05-01 RX ADMIN — Medication 3 MILLILITER(S): at 06:06

## 2017-05-01 RX ADMIN — METHOCARBAMOL 750 MILLIGRAM(S): 500 TABLET, FILM COATED ORAL at 12:52

## 2017-05-01 RX ADMIN — Medication 100 MILLIGRAM(S): at 18:37

## 2017-05-01 RX ADMIN — Medication 40 MILLIGRAM(S): at 18:37

## 2017-05-01 RX ADMIN — SENNA PLUS 2 TABLET(S): 8.6 TABLET ORAL at 21:43

## 2017-05-01 RX ADMIN — Medication 1 ENEMA: at 11:58

## 2017-05-01 RX ADMIN — BUDESONIDE AND FORMOTEROL FUMARATE DIHYDRATE 1 PUFF(S): 160; 4.5 AEROSOL RESPIRATORY (INHALATION) at 21:44

## 2017-05-01 RX ADMIN — Medication 1 SPRAY(S): at 06:22

## 2017-05-01 RX ADMIN — GABAPENTIN 200 MILLIGRAM(S): 400 CAPSULE ORAL at 21:43

## 2017-05-01 RX ADMIN — Medication 3 MILLILITER(S): at 22:23

## 2017-05-01 RX ADMIN — LORATADINE 10 MILLIGRAM(S): 10 TABLET ORAL at 12:51

## 2017-05-01 RX ADMIN — BUDESONIDE AND FORMOTEROL FUMARATE DIHYDRATE 1 PUFF(S): 160; 4.5 AEROSOL RESPIRATORY (INHALATION) at 12:01

## 2017-05-01 RX ADMIN — METHOCARBAMOL 750 MILLIGRAM(S): 500 TABLET, FILM COATED ORAL at 21:43

## 2017-05-02 LAB
ANION GAP SERPL CALC-SCNC: 6 MMOL/L — SIGNIFICANT CHANGE UP (ref 5–17)
BASOPHILS # BLD AUTO: 0.1 K/UL — SIGNIFICANT CHANGE UP (ref 0–0.2)
BASOPHILS NFR BLD AUTO: 0.6 % — SIGNIFICANT CHANGE UP (ref 0–2)
BUN SERPL-MCNC: 25 MG/DL — HIGH (ref 7–18)
CALCIUM SERPL-MCNC: 8.6 MG/DL — SIGNIFICANT CHANGE UP (ref 8.4–10.5)
CHLORIDE SERPL-SCNC: 105 MMOL/L — SIGNIFICANT CHANGE UP (ref 96–108)
CO2 SERPL-SCNC: 30 MMOL/L — SIGNIFICANT CHANGE UP (ref 22–31)
CREAT SERPL-MCNC: 1.05 MG/DL — SIGNIFICANT CHANGE UP (ref 0.5–1.3)
EOSINOPHIL # BLD AUTO: 0 K/UL — SIGNIFICANT CHANGE UP (ref 0–0.5)
EOSINOPHIL NFR BLD AUTO: 0 % — SIGNIFICANT CHANGE UP (ref 0–6)
GLUCOSE SERPL-MCNC: 92 MG/DL — SIGNIFICANT CHANGE UP (ref 70–99)
HCT VFR BLD CALC: 35.6 % — SIGNIFICANT CHANGE UP (ref 34.5–45)
HGB BLD-MCNC: 11.7 G/DL — SIGNIFICANT CHANGE UP (ref 11.5–15.5)
IGE SERPL-ACNC: 656 IU/ML — HIGH (ref 0–100)
LYMPHOCYTES # BLD AUTO: 2.8 K/UL — SIGNIFICANT CHANGE UP (ref 1–3.3)
LYMPHOCYTES # BLD AUTO: 30.4 % — SIGNIFICANT CHANGE UP (ref 13–44)
MCHC RBC-ENTMCNC: 30.9 PG — SIGNIFICANT CHANGE UP (ref 27–34)
MCHC RBC-ENTMCNC: 32.9 GM/DL — SIGNIFICANT CHANGE UP (ref 32–36)
MCV RBC AUTO: 93.9 FL — SIGNIFICANT CHANGE UP (ref 80–100)
MONOCYTES # BLD AUTO: 0.4 K/UL — SIGNIFICANT CHANGE UP (ref 0–0.9)
MONOCYTES NFR BLD AUTO: 4.9 % — SIGNIFICANT CHANGE UP (ref 2–14)
NEUTROPHILS # BLD AUTO: 5.8 K/UL — SIGNIFICANT CHANGE UP (ref 1.8–7.4)
NEUTROPHILS NFR BLD AUTO: 64.1 % — SIGNIFICANT CHANGE UP (ref 43–77)
PLATELET # BLD AUTO: 271 K/UL — SIGNIFICANT CHANGE UP (ref 150–400)
POTASSIUM SERPL-MCNC: 4.7 MMOL/L — SIGNIFICANT CHANGE UP (ref 3.5–5.3)
POTASSIUM SERPL-SCNC: 4.7 MMOL/L — SIGNIFICANT CHANGE UP (ref 3.5–5.3)
RBC # BLD: 3.8 M/UL — SIGNIFICANT CHANGE UP (ref 3.8–5.2)
RBC # FLD: 13 % — SIGNIFICANT CHANGE UP (ref 10.3–14.5)
SODIUM SERPL-SCNC: 141 MMOL/L — SIGNIFICANT CHANGE UP (ref 135–145)
WBC # BLD: 9.1 K/UL — SIGNIFICANT CHANGE UP (ref 3.8–10.5)
WBC # FLD AUTO: 9.1 K/UL — SIGNIFICANT CHANGE UP (ref 3.8–10.5)

## 2017-05-02 PROCEDURE — 99232 SBSQ HOSP IP/OBS MODERATE 35: CPT

## 2017-05-02 PROCEDURE — 71010: CPT | Mod: 26

## 2017-05-02 RX ORDER — MONTELUKAST 4 MG/1
10 TABLET, CHEWABLE ORAL AT BEDTIME
Qty: 0 | Refills: 0 | Status: DISCONTINUED | OUTPATIENT
Start: 2017-05-03 | End: 2017-05-03

## 2017-05-02 RX ORDER — EPINEPHRINE 11.25MG/ML
3 SOLUTION, NON-ORAL INHALATION ONCE
Qty: 0 | Refills: 0 | Status: COMPLETED | OUTPATIENT
Start: 2017-05-02 | End: 2017-05-02

## 2017-05-02 RX ADMIN — GABAPENTIN 200 MILLIGRAM(S): 400 CAPSULE ORAL at 22:33

## 2017-05-02 RX ADMIN — GABAPENTIN 200 MILLIGRAM(S): 400 CAPSULE ORAL at 06:08

## 2017-05-02 RX ADMIN — MONTELUKAST 10 MILLIGRAM(S): 4 TABLET, CHEWABLE ORAL at 13:52

## 2017-05-02 RX ADMIN — METHOCARBAMOL 750 MILLIGRAM(S): 500 TABLET, FILM COATED ORAL at 13:52

## 2017-05-02 RX ADMIN — Medication 3 MILLILITER(S): at 18:25

## 2017-05-02 RX ADMIN — METHOCARBAMOL 750 MILLIGRAM(S): 500 TABLET, FILM COATED ORAL at 22:35

## 2017-05-02 RX ADMIN — PANTOPRAZOLE SODIUM 40 MILLIGRAM(S): 20 TABLET, DELAYED RELEASE ORAL at 06:08

## 2017-05-02 RX ADMIN — Medication 1 ENEMA: at 13:51

## 2017-05-02 RX ADMIN — Medication 40 MILLIGRAM(S): at 06:08

## 2017-05-02 RX ADMIN — Medication 3 MILLILITER(S): at 10:01

## 2017-05-02 RX ADMIN — Medication 3 MILLILITER(S): at 06:32

## 2017-05-02 RX ADMIN — Medication 3 MILLILITER(S): at 21:42

## 2017-05-02 RX ADMIN — LORATADINE 10 MILLIGRAM(S): 10 TABLET ORAL at 13:52

## 2017-05-02 RX ADMIN — Medication 100 MILLIGRAM(S): at 06:08

## 2017-05-02 RX ADMIN — SENNA PLUS 2 TABLET(S): 8.6 TABLET ORAL at 22:34

## 2017-05-02 RX ADMIN — Medication 50 MILLIGRAM(S): at 15:15

## 2017-05-02 RX ADMIN — BUDESONIDE AND FORMOTEROL FUMARATE DIHYDRATE 1 PUFF(S): 160; 4.5 AEROSOL RESPIRATORY (INHALATION) at 13:51

## 2017-05-02 RX ADMIN — BUDESONIDE AND FORMOTEROL FUMARATE DIHYDRATE 1 PUFF(S): 160; 4.5 AEROSOL RESPIRATORY (INHALATION) at 22:33

## 2017-05-02 RX ADMIN — BENZOCAINE AND MENTHOL 1 LOZENGE: 5; 1 LIQUID ORAL at 13:52

## 2017-05-02 RX ADMIN — GABAPENTIN 200 MILLIGRAM(S): 400 CAPSULE ORAL at 13:52

## 2017-05-02 RX ADMIN — Medication 3 MILLILITER(S): at 18:04

## 2017-05-02 RX ADMIN — METHOCARBAMOL 750 MILLIGRAM(S): 500 TABLET, FILM COATED ORAL at 06:08

## 2017-05-02 RX ADMIN — ENOXAPARIN SODIUM 40 MILLIGRAM(S): 100 INJECTION SUBCUTANEOUS at 00:35

## 2017-05-02 RX ADMIN — Medication 125 MILLIGRAM(S): at 18:14

## 2017-05-02 RX ADMIN — Medication 1 SPRAY(S): at 06:08

## 2017-05-02 RX ADMIN — Medication 3 MILLILITER(S): at 13:45

## 2017-05-02 NOTE — SWALLOW BEDSIDE ASSESSMENT ADULT - NS SPL SWALLOW CLINIC TRIAL FT
Multiple swallows seen for each trial. Pt terminated trials 2/2 to pharyngeal stasis & claimed she needed liquid wash to clear bolus

## 2017-05-02 NOTE — SWALLOW BEDSIDE ASSESSMENT ADULT - PHARYNGEAL PHASE
Delayed throat clear post oral intake/Complaints of pharyngeal stasis/Delayed cough post oral intake/Multiple swallows Multiple swallows/Complaints of pharyngeal stasis

## 2017-05-02 NOTE — SPEECH LANGUAGE PATHOLOGY EVALUATION - SLP PERTINENT HISTORY OF CURRENT PROBLEM
51 year old F from home, w/ PMH severe persistent asthma, several allergies, w/ previous hospitalization for anaphylactic shock, chronic back pain, PSH of appendectomy, right hip surgery and hysterectomy, presented to ED w/ acute SOB & wheezing, as well as nasal congestion & L pleuritic chest pain. Reportedly, pt also endorsed recent nausea, vomiting, & diarrhea in ED. Per H&P, pt has h/o of difficulty intubation due to "throat closing" and was told she cannot be intubated again. ED reports also stated that PFT's at [facility] from 2 months ago revealed an upper airway condition 2/2 "something in throat".

## 2017-05-02 NOTE — SWALLOW BEDSIDE ASSESSMENT ADULT - SWALLOW EVAL: DIAGNOSIS
Oral phase of swallow WFL. Pt p/w s&s of pharyngeal phase dysphagia, c/b multiple swallows & c/o of pharyngeal stasis across consistencies.

## 2017-05-02 NOTE — SWALLOW BEDSIDE ASSESSMENT ADULT - SWALLOW EVAL: RECOMMENDED FEEDING/EATING TECHNIQUES
maintain upright posture during/after eating for 30 mins/position upright (90 degrees)/slow rate of eating/oral hygiene/alternate food with liquid/small sips/bites

## 2017-05-02 NOTE — SPEECH LANGUAGE PATHOLOGY EVALUATION - SLP DIAGNOSIS
Pt p/w hoarse, raspy vocal quality with notable glottal del valle, reported intermittent aphonia (suspected spasmodic). Vocal quality likely symptompatic of GERD.

## 2017-05-02 NOTE — SWALLOW BEDSIDE ASSESSMENT ADULT - SLP PERTINENT HISTORY OF CURRENT PROBLEM
51 year old F from home, w/ PMH severe persistent asthma, several allergies, w/ previous hospitalization for anaphylactic shock, chronic back pain, PSH of appendectomy, right hip surgery and hysterectomy, presented to ED w/ acute SOB & wheezing, as well as nasal congestion & L pleuritic chest pain. Reportedly, pt also endorsed recent nausea, vomiting, & diarrhea in ED. Per H&P, pt has h/o of difficulty intubation due to "throat closing" and was told she cannot be intubated again. ED reports also stated that PFT's at [facility] from 2 months ago revealed an upper airway condition 2/2 "something in throat".

## 2017-05-02 NOTE — SPEECH LANGUAGE PATHOLOGY EVALUATION - COMMENTS
Consult received, chart reviewed. Pt seen for speech-language evaluation 2/2 vocal fold dysfunction. P As per Pt description, Pt reports intermittent aphonia, suspected spasmodic. Recent ENT laryngoscopy 4/30 showed airway patency WFL, with no notable obstructions; however, evidence of GERD was seen. Pt would benefit from f/u with GI to start anti-reflux regiment. Glottal del valle noted

## 2017-05-02 NOTE — SWALLOW BEDSIDE ASSESSMENT ADULT - ASR SWALLOW ASPIRATION MONITOR
upper respiratory infection/cough/pneumonia/gurgly voice/oral hygiene/throat clearing/fever/change of breathing pattern/position upright (90Y)

## 2017-05-02 NOTE — SWALLOW BEDSIDE ASSESSMENT ADULT - COMMENTS
Received upright in bed, AAOx4. Pt stated food often feels "stuck", and she requires multiple swallows and a liquid wash to clear stasis. Also endorsed increased difficulty with "heavier" foods, such as meats, citing odynophagia and vomiting on these consistencies. Multiple swallows seen for each trial

## 2017-05-03 VITALS
HEART RATE: 83 BPM | TEMPERATURE: 99 F | OXYGEN SATURATION: 100 % | RESPIRATION RATE: 16 BRPM | SYSTOLIC BLOOD PRESSURE: 101 MMHG | DIASTOLIC BLOOD PRESSURE: 65 MMHG

## 2017-05-03 PROCEDURE — 94660 CPAP INITIATION&MGMT: CPT

## 2017-05-03 PROCEDURE — 82785 ASSAY OF IGE: CPT

## 2017-05-03 PROCEDURE — 93005 ELECTROCARDIOGRAM TRACING: CPT

## 2017-05-03 PROCEDURE — 99291 CRITICAL CARE FIRST HOUR: CPT | Mod: 25

## 2017-05-03 PROCEDURE — 74230 X-RAY XM SWLNG FUNCJ C+: CPT | Mod: 26

## 2017-05-03 PROCEDURE — 94640 AIRWAY INHALATION TREATMENT: CPT

## 2017-05-03 PROCEDURE — 84100 ASSAY OF PHOSPHORUS: CPT

## 2017-05-03 PROCEDURE — 80053 COMPREHEN METABOLIC PANEL: CPT

## 2017-05-03 PROCEDURE — 83735 ASSAY OF MAGNESIUM: CPT

## 2017-05-03 PROCEDURE — 87633 RESP VIRUS 12-25 TARGETS: CPT

## 2017-05-03 PROCEDURE — 87798 DETECT AGENT NOS DNA AMP: CPT

## 2017-05-03 PROCEDURE — 80048 BASIC METABOLIC PNL TOTAL CA: CPT

## 2017-05-03 PROCEDURE — 87040 BLOOD CULTURE FOR BACTERIA: CPT

## 2017-05-03 PROCEDURE — 87486 CHLMYD PNEUM DNA AMP PROBE: CPT

## 2017-05-03 PROCEDURE — 85027 COMPLETE CBC AUTOMATED: CPT

## 2017-05-03 PROCEDURE — 99239 HOSP IP/OBS DSCHRG MGMT >30: CPT

## 2017-05-03 PROCEDURE — 92611 MOTION FLUOROSCOPY/SWALLOW: CPT

## 2017-05-03 PROCEDURE — 82803 BLOOD GASES ANY COMBINATION: CPT

## 2017-05-03 PROCEDURE — 92610 EVALUATE SWALLOWING FUNCTION: CPT

## 2017-05-03 PROCEDURE — 71045 X-RAY EXAM CHEST 1 VIEW: CPT

## 2017-05-03 PROCEDURE — 74230 X-RAY XM SWLNG FUNCJ C+: CPT

## 2017-05-03 PROCEDURE — 87581 M.PNEUMON DNA AMP PROBE: CPT

## 2017-05-03 RX ORDER — TIOTROPIUM BROMIDE 18 UG/1
1 CAPSULE ORAL; RESPIRATORY (INHALATION)
Qty: 0 | Refills: 0 | COMMUNITY
Start: 2017-05-03

## 2017-05-03 RX ORDER — MONTELUKAST 4 MG/1
1 TABLET, CHEWABLE ORAL
Qty: 30 | Refills: 0 | OUTPATIENT
Start: 2017-05-03 | End: 2017-06-02

## 2017-05-03 RX ORDER — ALBUTEROL 90 UG/1
1 AEROSOL, METERED ORAL
Qty: 0 | Refills: 0 | COMMUNITY
Start: 2017-05-03

## 2017-05-03 RX ORDER — PANTOPRAZOLE SODIUM 20 MG/1
1 TABLET, DELAYED RELEASE ORAL
Qty: 0 | Refills: 0 | COMMUNITY
Start: 2017-05-03

## 2017-05-03 RX ORDER — PANTOPRAZOLE SODIUM 20 MG/1
1 TABLET, DELAYED RELEASE ORAL
Qty: 30 | Refills: 0 | OUTPATIENT
Start: 2017-05-03 | End: 2017-06-02

## 2017-05-03 RX ORDER — IPRATROPIUM/ALBUTEROL SULFATE 18-103MCG
3 AEROSOL WITH ADAPTER (GRAM) INHALATION
Qty: 0 | Refills: 0 | COMMUNITY
Start: 2017-05-03

## 2017-05-03 RX ORDER — BUDESONIDE AND FORMOTEROL FUMARATE DIHYDRATE 160; 4.5 UG/1; UG/1
0 AEROSOL RESPIRATORY (INHALATION)
Qty: 0 | Refills: 0 | COMMUNITY
Start: 2017-05-03

## 2017-05-03 RX ORDER — TIOTROPIUM BROMIDE 18 UG/1
1 CAPSULE ORAL; RESPIRATORY (INHALATION)
Qty: 30 | Refills: 0 | OUTPATIENT
Start: 2017-05-03 | End: 2017-06-02

## 2017-05-03 RX ADMIN — Medication 1 SPRAY(S): at 05:39

## 2017-05-03 RX ADMIN — BUDESONIDE AND FORMOTEROL FUMARATE DIHYDRATE 1 PUFF(S): 160; 4.5 AEROSOL RESPIRATORY (INHALATION) at 12:31

## 2017-05-03 RX ADMIN — Medication 3 MILLILITER(S): at 10:04

## 2017-05-03 RX ADMIN — Medication 3 MILLILITER(S): at 14:29

## 2017-05-03 RX ADMIN — Medication 50 MILLIGRAM(S): at 05:30

## 2017-05-03 RX ADMIN — GABAPENTIN 200 MILLIGRAM(S): 400 CAPSULE ORAL at 05:30

## 2017-05-03 RX ADMIN — Medication 3 MILLILITER(S): at 05:28

## 2017-05-03 RX ADMIN — Medication 100 MILLIGRAM(S): at 05:30

## 2017-05-03 RX ADMIN — BENZOCAINE AND MENTHOL 1 LOZENGE: 5; 1 LIQUID ORAL at 05:30

## 2017-05-03 RX ADMIN — METHOCARBAMOL 750 MILLIGRAM(S): 500 TABLET, FILM COATED ORAL at 05:30

## 2017-05-03 RX ADMIN — ENOXAPARIN SODIUM 40 MILLIGRAM(S): 100 INJECTION SUBCUTANEOUS at 00:22

## 2017-05-03 RX ADMIN — GABAPENTIN 200 MILLIGRAM(S): 400 CAPSULE ORAL at 13:34

## 2017-05-03 RX ADMIN — LORATADINE 10 MILLIGRAM(S): 10 TABLET ORAL at 12:31

## 2017-05-03 RX ADMIN — METHOCARBAMOL 750 MILLIGRAM(S): 500 TABLET, FILM COATED ORAL at 13:36

## 2017-05-03 RX ADMIN — PANTOPRAZOLE SODIUM 40 MILLIGRAM(S): 20 TABLET, DELAYED RELEASE ORAL at 06:02

## 2017-05-03 RX ADMIN — BENZOCAINE AND MENTHOL 1 LOZENGE: 5; 1 LIQUID ORAL at 15:45

## 2017-05-03 NOTE — DISCHARGE NOTE ADULT - MEDICATION SUMMARY - MEDICATIONS TO STOP TAKING
I will STOP taking the medications listed below when I get home from the hospital:    Motrin 600 mg oral tablet  -- 1 tab(s) by mouth every 6 hours

## 2017-05-03 NOTE — DISCHARGE NOTE ADULT - PLAN OF CARE
Asthma control Take your asthma medications on regular basis daily.  Follow up with your Asthma specialist/pulmonologist within 2-3 days  Please see also allergy specialist as soon as possible.   Avoid allergens such as air pollutants, foods and other aggregating factors (such as soaps, detergents)  Use Hepa filer at home  In allergy season keep your windows closed, avoid going outside especially morning when air pollen is the highest. Upon return home change your clothes and take a shower.  For any worsening, any new or concerning symptoms return to Emergency Room or call 911 Take Protonix daily.   Alternate food with liquid when eating. Have small sips/bites. Maintain upright posture during/after eating for 30 mins. Perform oral hygiene daily.   Follow up with gastroenterologists outpatient within 1 week or sooner.    Have a low gastric stimulating diet. Have small frequent meals. Take your pain medications as prescribed  Follow up with your Primary Care Physician within 1 week or sooner. Have a healthy diet  Follow up with your Primary Care Physician within 1 week or sooner  Have blood work repeated BMP within 1 week or sooner to check for potassium level.  For any chest pain, palpitations, any new or worsening symptoms return to emergency room or call 911. Follow up with your orthopedist for your regular visit Prevent new episodes. prevent reflux Prevent pain and adequate quality of life. Likely precipitated by Spring Allergies.   Take your asthma medications on regular basis daily.  Follow up with your Asthma specialist/pulmonologist within one week  Please see also allergy specialist as soon as possible. Please taper off steroids as prescribed.   Avoid allergens such as air pollutants, foods and other aggregating factors (such as soaps, detergents)  Use Hepa filer at home  In allergy season keep your windows closed, avoid going outside especially morning when air pollen is the highest. Upon return home change your clothes and take a shower.  For any worsening, any new or concerning symptoms return to Emergency Room or call 911 Take Protonix daily 30 mins before breakfast.   Alternate food with liquid when eating. Have small sips/bites.   Maintain upright posture during/after eating for 30 mins. Perform oral hygiene daily.   Follow up with gastroenterologists outpatient within 1 week or sooner.    Have a low gastric stimulating diet. Have small frequent meals.  Follow up with gastroenterologist Dr. Qiu for Endoscopy as outpatient if symptoms persist. Take your pain medications as prescribed as before.   Follow up with your Primary Care Physician within 1 week or sooner. Take your Allergic medications regularly.  If you have similar recurrent episodes, please follow up with Allergy and Immunologist as outpatient.  You was evaluated by Speech pathologist and also had a Modified Barium Swallow (MBS) which did not show any obstructive pathology.  You were also evaluated by ENT and scope did not show any evidence of vocal cord abnormality. FOLLOW UP WITH YOUR ALLERGIST AS SCHEDULED MAY 15, 2017  Your Asthma is likely precipitated by Spring Allergies.   Take your asthma medications on regular basis daily.  Follow up also with your Asthma specialist/pulmonologist within one week  Please see also allergy specialist as soon as possible. Please taper off steroids as prescribed.   Avoid allergens such as air pollutants, foods and other aggregating factors (such as soaps, detergents)  Use Hepa filer at home  In allergy season keep your windows closed, avoid going outside especially morning when air pollen is the highest. Upon return home change your clothes and take a shower.  For any worsening, any new or concerning symptoms return to Emergency Room or call 911 Take your Allergic medications regularly. FOLLOW UP WITH YOUR ALLERGY SPECIALIST WITHIN 1 WEEK OR AS SOON AS POSSIBLE  If you have similar recurrent episodes, please follow up with Allergy and Immunologist as outpatient.  You was evaluated by Speech pathologist and also had a Modified Barium Swallow (MBS) which did not show any obstructive pathology.  You were also evaluated by ENT and scope did not show any evidence of vocal cord abnormality.

## 2017-05-03 NOTE — DISCHARGE NOTE ADULT - SECONDARY DIAGNOSIS.
Gastroenteritis Chronic pain Hypokalemia History of right hip replacement GERD (gastroesophageal reflux disease) Chronic back pain greater than 3 months duration Stridor

## 2017-05-03 NOTE — DISCHARGE NOTE ADULT - PATIENT PORTAL LINK FT
“You can access the [facility] Patient Portal, offered by [facility], by registering with the following website: [email or web address]”

## 2017-05-03 NOTE — SWALLOW VFSS/MBS ASSESSMENT ADULT - DIAGNOSTIC IMPRESSIONS
Overall swallow is WFL, c/b adequate bolus acceptance, formation, containment, & transfer, w/ no penetration of material into the airway. Piecemeal deglutition noted for all consistencies, as well as trace amounts of palatal residue; however, pt independently utilized multiple swallows to effectively clear bolus & stasis.

## 2017-05-03 NOTE — DISCHARGE NOTE ADULT - MEDICATION SUMMARY - MEDICATIONS TO TAKE
I will START or STAY ON the medications listed below when I get home from the hospital:    gabapentin 100 mg oral capsule  -- 2 cap(s) by mouth 3 times a day  -- Indication: For Chronic pain    ZyrTEC  --  by mouth   -- Indication: For Severe persistent asthma with acute exacerbation    Claritin 10 mg oral tablet  -- 1 tab(s) by mouth once a day  -- Indication: For Severe persistent asthma with acute exacerbation    albuterol 90 mcg/inh inhalation aerosol  -- 1 puff(s) inhaled every 4 hours  -- Indication: For Severe persistent asthma with acute exacerbation    albuterol-ipratropium 2.5 mg-0.5 mg/3 mL inhalation solution  -- 3 milliliter(s) inhaled every 4 hours  -- Indication: For Severe persistent asthma with acute exacerbation    budesonide-formoterol 80 mcg-4.5 mcg/inh inhalation aerosol  --  inhaled   -- Indication: For Severe persistent asthma with acute exacerbation    tiotropium 18 mcg inhalation capsule  -- 1 cap(s) inhaled once a day  -- Indication: For Severe persistent asthma with acute exacerbation    Singulair 10 mg oral tablet  -- 1 tab(s) by mouth once a day  -- Indication: For Severe persistent asthma with acute exacerbation    methocarbamol 750 mg oral tablet  -- 1 tab(s) by mouth 3 times a day  -- Indication: For Chronic pain    fluticasone 27.5 mcg/inh nasal spray  -- 1 spray(s) into nose once a day  -- Indication: For Severe persistent asthma with acute exacerbation    pantoprazole 40 mg oral delayed release tablet  -- 1 tab(s) by mouth once a day (before a meal)  -- Indication: For GERD I will START or STAY ON the medications listed below when I get home from the hospital:    predniSONE 10 mg oral tablet  -- 4 tab(s) by mouth once a day x 3 days 3 tab(s) by mouth once a day x 3 days 2 tab(s) by mouth once a day x 3 days 1 tab(s) by mouth once a day x 3 days  -- Indication: For Asthma    gabapentin 100 mg oral capsule  -- 2 cap(s) by mouth 3 times a day  -- Indication: For Chronic pain    ZyrTEC  --  by mouth   -- Indication: For Asthma    Claritin 10 mg oral tablet  -- 1 tab(s) by mouth once a day  -- Indication: For Asthma    albuterol 90 mcg/inh inhalation aerosol  -- 1 puff(s) inhaled every 4 hours  -- Indication: For Asthma    albuterol-ipratropium 2.5 mg-0.5 mg/3 mL inhalation solution  -- 3 milliliter(s) inhaled every 4 hours  -- Indication: For Asthma    budesonide-formoterol 80 mcg-4.5 mcg/inh inhalation aerosol  --  inhaled   -- Indication: For Asthma    tiotropium 18 mcg inhalation capsule  -- 1 cap(s) inhaled once a day  -- Indication: For Asthma    Singulair 10 mg oral tablet  -- 1 tab(s) by mouth once a day  -- Indication: For Asthma    methocarbamol 750 mg oral tablet  -- 1 tab(s) by mouth 3 times a day  -- Indication: For Chronic pain    fluticasone 27.5 mcg/inh nasal spray  -- 1 spray(s) into nose once a day  -- Indication: For Asthma    pantoprazole 40 mg oral delayed release tablet  -- 1 tab(s) by mouth once a day (before a meal)  -- Indication: For GERD (gastroesophageal reflux disease)

## 2017-05-03 NOTE — DISCHARGE NOTE ADULT - PROVIDER TOKENS
FREE:[LAST:[Sacks],FIRST:[Criselda],PHONE:[phone]],FAX:[(   [phone]    -],ADDRESS:[Address: [address] [place] [address]  Phone: [phone]]],FREE:[LAST:[CALL],PHONE:[(   )    -],FAX:[(   )    -],ADDRESS:[FOLLOW UP WITH YOUR ALLERGY SPECIALIST WITHIN 1 WEEK OR AS SOON AS POSSIBLE    [facility]  [address] [place] [address] For an appointment, please call [phone]]]

## 2017-05-03 NOTE — SWALLOW VFSS/MBS ASSESSMENT ADULT - PHARYNGEAL PHASE COMMENTS
Intact and timely. Piecemeal deglutition noted for all trials; however, bolus and residue effectively cleared with independent multiple swallows. Intact and timely. Piecemeal deglutition noted for all trials; however, bolus effectively cleared with independent multiple swallows. Pt c/o pharyngeal stasis and discomfort, though bolus was effectively cleared.

## 2017-05-03 NOTE — DISCHARGE NOTE ADULT - CARE PROVIDER_API CALL
Sacks, Diane  Address: [address] [place] [address]  Phone: [phone]  Phone: [phone]  Fax: (   )    -    CALL,   FOLLOW UP WITH YOUR ALLERGY SPECIALIST WITHIN 1 WEEK OR AS SOON AS POSSIBLE    [facility]  [address] [place] [address] For an appointment, please call [phone]  Phone: (   )    -  Fax: (   )    -

## 2017-05-03 NOTE — SWALLOW VFSS/MBS ASSESSMENT ADULT - COMMENTS
ORAL PHASE: Pt p/w adequate bolus acceptance, containment, & propulsion. Trace     PHARYNGEAL PHASE: Study conducted with Radiologist, Dr. Wiseman. Pt seated in imaging chair in left lateral and A-P views. PO trials of Puree, Solid, & Thin Liquids via straw and cup sips were administered. Pt continued to c/o of pharyngeal stasis & discomfort post thin liquid PO trials despite efficient pharyngeal clearance and cricopharyngeal opening; suspect esophageal etiology.

## 2017-05-03 NOTE — SWALLOW VFSS/MBS ASSESSMENT ADULT - SLP GENERAL OBSERVATIONS
Study conducted with Radiologist, Dr. Wiseman. Pt seated in imaging chair in left lateral and A-P views. PO trials of Puree, Solid, & Thin Liquids were administered. Pt AAOx3, Pt AAOx3, independent transfer into imaging chair.

## 2017-05-03 NOTE — DISCHARGE NOTE ADULT - CARE PLAN
Principal Discharge DX:	Severe persistent asthma with acute exacerbation  Goal:	Asthma control  Instructions for follow-up, activity and diet:	Take your asthma medications on regular basis daily.  Follow up with your Asthma specialist/pulmonologist within 2-3 days  Please see also allergy specialist as soon as possible.   Avoid allergens such as air pollutants, foods and other aggregating factors (such as soaps, detergents)  Use Hepa filer at home  In allergy season keep your windows closed, avoid going outside especially morning when air pollen is the highest. Upon return home change your clothes and take a shower.  For any worsening, any new or concerning symptoms return to Emergency Room or call 911  Secondary Diagnosis:	Gastroenteritis  Instructions for follow-up, activity and diet:	Take Protonix daily.   Alternate food with liquid when eating. Have small sips/bites. Maintain upright posture during/after eating for 30 mins. Perform oral hygiene daily.   Follow up with gastroenterologists outpatient within 1 week or sooner.    Have a low gastric stimulating diet. Have small frequent meals.  Secondary Diagnosis:	Chronic pain  Instructions for follow-up, activity and diet:	Take your pain medications as prescribed  Follow up with your Primary Care Physician within 1 week or sooner.  Secondary Diagnosis:	Hypokalemia  Instructions for follow-up, activity and diet:	Have a healthy diet  Follow up with your Primary Care Physician within 1 week or sooner  Have blood work repeated BMP within 1 week or sooner to check for potassium level.  For any chest pain, palpitations, any new or worsening symptoms return to emergency room or call 911.  Secondary Diagnosis:	History of right hip replacement  Instructions for follow-up, activity and diet:	Follow up with your orthopedist for your regular visit Principal Discharge DX:	Moderate persistent asthma with acute exacerbation  Goal:	Asthma control  Instructions for follow-up, activity and diet:	Likely precipitated by Spring Allergies.   Take your asthma medications on regular basis daily.  Follow up with your Asthma specialist/pulmonologist within one week  Please see also allergy specialist as soon as possible. Please taper off steroids as prescribed.   Avoid allergens such as air pollutants, foods and other aggregating factors (such as soaps, detergents)  Use Hepa filer at home  In allergy season keep your windows closed, avoid going outside especially morning when air pollen is the highest. Upon return home change your clothes and take a shower.  For any worsening, any new or concerning symptoms return to Emergency Room or call 911  Secondary Diagnosis:	GERD (gastroesophageal reflux disease)  Goal:	prevent reflux  Instructions for follow-up, activity and diet:	Take Protonix daily 30 mins before breakfast.   Alternate food with liquid when eating. Have small sips/bites.   Maintain upright posture during/after eating for 30 mins. Perform oral hygiene daily.   Follow up with gastroenterologists outpatient within 1 week or sooner.    Have a low gastric stimulating diet. Have small frequent meals.  Follow up with gastroenterologist Dr. Qiu for Endoscopy as outpatient if symptoms persist.  Secondary Diagnosis:	Chronic back pain greater than 3 months duration  Goal:	Prevent pain and adequate quality of life.  Instructions for follow-up, activity and diet:	Take your pain medications as prescribed as before.   Follow up with your Primary Care Physician within 1 week or sooner.  Secondary Diagnosis:	Stridor  Goal:	Prevent new episodes.  Instructions for follow-up, activity and diet:	Take your Allergic medications regularly.  If you have similar recurrent episodes, please follow up with Allergy and Immunologist as outpatient.  You was evaluated by Speech pathologist and also had a Modified Barium Swallow (MBS) which did not show any obstructive pathology.  You were also evaluated by ENT and scope did not show any evidence of vocal cord abnormality. Principal Discharge DX:	Moderate persistent asthma with acute exacerbation  Goal:	Asthma control  Instructions for follow-up, activity and diet:	FOLLOW UP WITH YOUR ALLERGIST AS SCHEDULED MAY 15, 2017  Your Asthma is likely precipitated by Spring Allergies.   Take your asthma medications on regular basis daily.  Follow up also with your Asthma specialist/pulmonologist within one week  Please see also allergy specialist as soon as possible. Please taper off steroids as prescribed.   Avoid allergens such as air pollutants, foods and other aggregating factors (such as soaps, detergents)  Use Hepa filer at home  In allergy season keep your windows closed, avoid going outside especially morning when air pollen is the highest. Upon return home change your clothes and take a shower.  For any worsening, any new or concerning symptoms return to Emergency Room or call 911  Secondary Diagnosis:	GERD (gastroesophageal reflux disease)  Goal:	prevent reflux  Instructions for follow-up, activity and diet:	Take Protonix daily 30 mins before breakfast.   Alternate food with liquid when eating. Have small sips/bites.   Maintain upright posture during/after eating for 30 mins. Perform oral hygiene daily.   Follow up with gastroenterologists outpatient within 1 week or sooner.    Have a low gastric stimulating diet. Have small frequent meals.  Follow up with gastroenterologist Dr. Qiu for Endoscopy as outpatient if symptoms persist.  Secondary Diagnosis:	Chronic back pain greater than 3 months duration  Goal:	Prevent pain and adequate quality of life.  Instructions for follow-up, activity and diet:	Take your pain medications as prescribed as before.   Follow up with your Primary Care Physician within 1 week or sooner.  Secondary Diagnosis:	Stridor  Goal:	Prevent new episodes.  Instructions for follow-up, activity and diet:	Take your Allergic medications regularly. FOLLOW UP WITH YOUR ALLERGY SPECIALIST WITHIN 1 WEEK OR AS SOON AS POSSIBLE  If you have similar recurrent episodes, please follow up with Allergy and Immunologist as outpatient.  You was evaluated by Speech pathologist and also had a Modified Barium Swallow (MBS) which did not show any obstructive pathology.  You were also evaluated by ENT and scope did not show any evidence of vocal cord abnormality.

## 2017-05-03 NOTE — SWALLOW VFSS/MBS ASSESSMENT ADULT - RECOMMENDED FEEDING/EATING TECHNIQUES
oral hygiene/maintain upright posture during/after eating for 30 mins/small sips/bites/alternate food with liquid

## 2017-05-03 NOTE — SWALLOW VFSS/MBS ASSESSMENT ADULT - CONSISTENCIES ADMINISTERED
applesauce, x2/puree solid/toñito cracker, x2 thin liquid/water; x2 cup sips; rapid consecutive straw sips

## 2017-05-03 NOTE — SWALLOW VFSS/MBS ASSESSMENT ADULT - SLP PERTINENT HISTORY OF CURRENT PROBLEM
51 year old F from home, w/ PMH severe persistent asthma, several allergies, w/ previous hospitalization for anaphylactic shock, chronic back pain, PSH of appendectomy, right hip surgery and hysterectomy, presented to ED w/ acute SOB & wheezing, as well as nasal congestion & L pleuritic chest pain. Reportedly, pt also endorsed recent nausea, vomiting, & diarrhea in ED. Per H&P, pt has h/o of difficulty intubation due to "throat closing" and was told she cannot be intubated again. ED reports also stated that PFT's at [facility] from 2 months ago revealed an upper airway condition 2/2 "something in throat". 51 year old F from home, w/ PMH severe persistent asthma, allergies, w/ previous hospitalization for anaphylactic shock, chronic back pain, PSH of appendectomy, right hip surgery and hysterectomy, presented to ED w/ acute SOB & wheezing. Recent ENT laryngoscopy 4/30 showed airway patency WFL, with no notable obstructions; however, evidence of GERD was seen. During bedside swallow on 5/2, pt endorsed pharyngeal stasis & vomiting on certain foods.

## 2017-05-03 NOTE — DISCHARGE NOTE ADULT - ADDITIONAL INSTRUCTIONS
Optimized and Improved Follow up with PCP in one week and Pulmonologist in 2 weeks  If persistent Follow up with PCP in one week and Pulmonologist in 2 weeks  FOLLOW UP WITH YOUR ALLERGY SPECIALIST WITHIN 1 WEEK OR AS SOON AS POSSIBLE

## 2017-05-04 LAB
CULTURE RESULTS: SIGNIFICANT CHANGE UP
SPECIMEN SOURCE: SIGNIFICANT CHANGE UP

## 2017-05-09 DIAGNOSIS — Z91.012 ALLERGY TO EGGS: ICD-10-CM

## 2017-05-09 DIAGNOSIS — J45.51 SEVERE PERSISTENT ASTHMA WITH (ACUTE) EXACERBATION: ICD-10-CM

## 2017-05-09 DIAGNOSIS — Z96.641 PRESENCE OF RIGHT ARTIFICIAL HIP JOINT: ICD-10-CM
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
## 2017-05-09 DIAGNOSIS — Z90.49 ACQUIRED ABSENCE OF OTHER SPECIFIED PARTS OF DIGESTIVE TRACT: ICD-10-CM

## 2017-05-09 DIAGNOSIS — R73.9 HYPERGLYCEMIA, UNSPECIFIED: ICD-10-CM

## 2017-05-09 DIAGNOSIS — E66.9 OBESITY, UNSPECIFIED: ICD-10-CM

## 2017-05-09 DIAGNOSIS — Z91.013 ALLERGY TO SEAFOOD: ICD-10-CM

## 2017-05-09 DIAGNOSIS — E87.6 HYPOKALEMIA: ICD-10-CM

## 2017-05-09 DIAGNOSIS — K21.9 GASTRO-ESOPHAGEAL REFLUX DISEASE WITHOUT ESOPHAGITIS: ICD-10-CM

## 2017-05-09 DIAGNOSIS — Z82.5 FAMILY HISTORY OF ASTHMA AND OTHER CHRONIC LOWER RESPIRATORY DISEASES: ICD-10-CM

## 2017-05-09 DIAGNOSIS — R06.1 STRIDOR: ICD-10-CM

## 2017-05-09 DIAGNOSIS — Z91.018 ALLERGY TO OTHER FOODS: ICD-10-CM

## 2017-05-09 DIAGNOSIS — G89.29 OTHER CHRONIC PAIN: ICD-10-CM

## 2017-05-09 DIAGNOSIS — T38.0X5A ADVERSE EFFECT OF GLUCOCORTICOIDS AND SYNTHETIC ANALOGUES, INITIAL ENCOUNTER: ICD-10-CM

## 2017-05-09 DIAGNOSIS — A08.4 VIRAL INTESTINAL INFECTION, UNSPECIFIED: ICD-10-CM

## 2017-05-09 DIAGNOSIS — J44.9 CHRONIC OBSTRUCTIVE PULMONARY DISEASE, UNSPECIFIED: ICD-10-CM

## 2017-05-10 RX ORDER — METHOCARBAMOL 500 MG/1
1 TABLET, FILM COATED ORAL
Qty: 0 | Refills: 0 | COMMUNITY

## 2017-05-10 RX ORDER — FLUTICASONE PROPIONATE 50 MCG
1 SPRAY, SUSPENSION NASAL
Qty: 0 | Refills: 0 | COMMUNITY

## 2017-05-10 RX ORDER — GABAPENTIN 400 MG/1
2 CAPSULE ORAL
Qty: 0 | Refills: 0 | COMMUNITY

## 2017-05-10 RX ORDER — LORATADINE 10 MG/1
1 TABLET ORAL
Qty: 0 | Refills: 0 | COMMUNITY

## 2017-05-10 RX ORDER — MONTELUKAST 4 MG/1
1 TABLET, CHEWABLE ORAL
Qty: 0 | Refills: 0 | COMMUNITY

## 2017-05-10 RX ORDER — IBUPROFEN 200 MG
1 TABLET ORAL
Qty: 0 | Refills: 0 | COMMUNITY

## 2017-05-10 RX ORDER — OXYBUTYNIN CHLORIDE 5 MG
1 TABLET ORAL
Qty: 0 | Refills: 0 | COMMUNITY

## 2017-05-10 RX ORDER — CETIRIZINE HYDROCHLORIDE 10 MG/1
0 TABLET ORAL
Qty: 0 | Refills: 0 | COMMUNITY

## 2021-06-16 NOTE — ED PROVIDER NOTE - OBJECTIVE STATEMENT
FEMALE PREVENTATIVE EXAM    Assessment and Plan:     Patient has been advised of split billing requirements and indicates understanding: Yes    1. Routine health maintenance    2. Nexplanon insertion  See procedure note  - Pregnancy, Urine    3. Screen for STD (sexually transmitted disease)  - Chlamydia trachomatis & Neisseria gonorrhoeae, Amplified Detection  - HIV Antigen/Antibody Screening Cascade  - Hepatitis C Antibody (Anti-HCV)  - Syphilis Screen, [place]    4. Screening for lipid disorders  - Lipid [place] RANDOM    5. Screening for malignant neoplasm of cervix  - Gynecologic Cytology (PAP Smear)    6. Screening for diabetes mellitus  - Basic Metabolic Panel    7. Genital herpes simplex, unspecified site  Not currently symptomatic.  - valACYclovir (VALTREX) 500 MG tablet; Take 1 tablet (500 mg total) by mouth 2 (two) times a day for 3 days.  Dispense: 36 tablet; Refill: 3    8. Current severe episode of major depressive disorder without psychotic features without prior episode (H)  Following with psychiatry.     9. Anxiety  Follows with psychiatry.    Next follow up:  Return in about 1 year (around 3/18/2022) for Physical.    Immunization Review  Adult Imm Review: No immunizations due today    I discussed the following with the patient:   Adult Healthy Living: Importance of regular exercise  Healthy nutrition  STI prevention  Contraception options      Subjective:   Chief Complaint: Myrna Martinez is an 29 y.o. female here for a preventative health visit.    Patient has been advised of split billing requirements and indicates understanding: Yes    HPI:  Patient is single.  She has a son.  Recently broke up with boyfriend and moved to [place].  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    Due for a pap.  History of LGSIL.  Not currently sexually active, but is requesting STD testing.  She is also due for a Nexplanon replacement today.  She gets menstrual bleeding every  "couple of weeks with the Nexplanon.  History of genital herpes.  Currently denies any symptoms.    Patient is following with psychiatry for treatment of anxiety and depression.  Currently on Wellbutrin and Propranolol.     Healthy Habits  Are you taking a daily aspirin? No  Do you typically exercising at least 40 min, 3-4 times per week?  NO  Do you usually eat at least 4 servings of fruit and vegetables a day, include whole grains and fiber and avoid regularly eating high fat foods? Yes  Have you had an eye exam in the past two years? Yes  Do you see a dentist twice per year? NO  Do you have any concerns regarding sleep? No    Safety Screen  If you own firearms, are they secured in a locked gun cabinet or with trigger locks? The patient does not own any firearms  Do you feel you are safe where you are living?: Yes (3/18/2021  2:03 PM)  Do you feel you are safe in your relationship(s)?: Yes (3/18/2021  2:03 PM)      Review of Systems:  Please see above.  The rest of the review of systems are negative for all systems.     Pap History:   No - age 21-29 PAP every 3 years recommended  Cancer Screening       Status Date      PAP SMEAR Overdue 5/23/2020      Done 5/23/2017      Patient has more history with this topic...          Patient Care Team:  Daniela العراقي NP as PCP - General (Family Medicine)  Daniela العراقي NP as Assigned PCP  Margarita Dinero, CNP as Assigned Behavioral Health Provider        History     Reviewed By Date/Time Sections Reviewed    Daniela لاعراقي NP 3/18/2021  2:14 PM Tobacco    Daniela العراقي NP 3/18/2021  2:08 PM Tobacco    Daniela العراقي NP 3/18/2021  2:07 PM Tobacco    Sri Arias MA 3/18/2021  2:03 PM Tobacco            Objective:   Vital Signs:   Visit Vitals  /78   Pulse 93   Ht 5' 3.25\" (1.607 m)   Wt 165 lb 1.6 oz (74.9 kg)   LMP 03/07/2021   BMI 29.02 kg/m           PHYSICAL EXAM  General Appearance: Alert, cooperative, no distress, appears stated " age  Head: Normocephalic, without obvious abnormality, atraumatic  Eyes: PERRL, conjunctiva/corneas clear, EOM's intact  Ears: Normal TM's and external ear canals, both ears  Nose: Nares normal, septum midline,mucosa normal, no drainage  Throat: Lips, mucosa, and tongue normal; teeth and gums normal  Neck: Supple, symmetrical, trachea midline, no adenopathy;  thyroid: not enlarged, symmetric, no tenderness/mass/nodules  Lungs: Clear to auscultation bilaterally, respirations unlabored  Breasts: No breast masses, tenderness, asymmetry, or nipple discharge. SP bilateral breast reduction.   Heart: Regular rate and rhythm, S1 and S2 normal, no murmur, rub, or gallop  Abdomen: Soft, non-tender, bowel sounds active all four quadrants,  no masses, no organomegaly  Pelvic:Normally developed genitalia with no external lesions or eruptions. Vagina and cervix show no lesions, inflammation, discharge or tenderness. No cystocele, No rectocele. No adnexal mass or tenderness.  Extremities: Extremities normal, atraumatic, no cyanosis or edema  Skin: Skin color, texture, turgor normal, no rashes  Lymph nodes: Cervical, supraclavicular, and axillary nodes normal  Neurologic: Normal   Psychologic: appropriate affective, answers all of my questions appropriately. No hallucinations, delusion, or suicidal ideations.    Daniela العراقي NP     52 yo female, pmh of ASthma, history of intubation in the past. Difficult intubation.

## 2024-06-06 NOTE — DISCHARGE NOTE ADULT - HOSPITAL COURSE
Biggest complaint in itching - more c/w with yeast.  On exam, discharge indeterminate.  Check BV, yeast, trich and GC/chlam.  --------  Updated 6/6 - GC/chlam neg.  pt reached out stating increase in itching.  Yeast swab pending, but will empirically tx with diflucan.    51 year old female from home, with history of Asthma with multiple hospitalizations including intubation for 24 hours last year and Allergies, Chronic back pain, PSH of appendectomy, right hip surgery and hysterectomy, presented with acute onset of shortness of breath and wheezing.     Patient had moderate persistent asthma since age 10 years requiring 1-2 puffs of inhalers a day and no night time symptoms. Two years ago she was admitted for anaphylactic shock and discovered to have several allergies to meat, nuts, eggs, shell fish, etc. Also her Asthma became severe persistent with frequent night time symptoms and multiple hospital admissions. She was placed on and off on intravenous steroids. Last year intubation at [facility] was "difficult as her throat was closing and she was extubated next day", was told "she cannot be intubated again should have tracheostomy" if invasive ventilation is needed. Two months ago had PFTs at [facility] and found to have an "upper airway condition "something in throat'. She was tapered off steroids about 2 months ago and inhaled steroids was recommended. Despite PMD's efforts in getting insurance to pay, she couldn't get the same and has been on inhalers and nebulisers only.  Two days ago she noticed windows of her room were open and she could smell fresh cut grass that initiated the asthma attack. Also has cough, shortness of breath wheeze, sputum (clear), feeling warm/no fever, headache, nasal congestion and left sided pleuritic chest pain. She was at her orthopedist's office from where she was sent to ED for SOB and wheeze.   She was having "greenish stools" for which PMD told her to take low fibre. Since yesterday she has been having nausea, vomiting and diarrhea. Describes vomitus as anything that she eats and sometimes "clear color with streaks of blood". She had 3 watery BMs yesterday. Denies any abdominal pain, weight loss, recent sick contact/travel or urinary problems. Due to allergies she cannot get Flu shot.   Lifetime non smoker, no passive smoking.     In the ED, she was given continuous albuterol nebs, magnesium, ketamine, and placed on BIPAP with significant improvement of symptoms.     She was admited to ICU:  Severe persistent asthma with acute exacerbation, secondary to seasonal allergy   - s/p Duonebs*3, ketamine 80 once, mag sulphate 2mg iv once and BiPAP with I/E 12/6. ABG 7.44/28/249/19  - Patient's peak flow at exam was 430ml. Her baseline is around 300ml.   - will admit to ICU and new Bipap was needed   - will continue Duonebs, solumedrol, Symbicort and Singulair   - patient saturated 99% with 2l NC;   - viral panel negative  - bcx negative  - out patient PFTs indicate that patient developed stridor after methacholine inhalation during fifth stage of test and was subsequently transferred to ED. Otherwise, PFTs and flow cytometry is normal; most likely diagnosis is vocal cord dysfunction.- secondary to seasonal allergy     Gastroenteritis, most likely viral gastroenteritis  - she received only on antibiotic 2 months ago during a hospital visit; no abdominal tenderness hence will not get order C diff   iv fluids and electrolyte replacement, later discontinued fluids, tolerating diet  patient evaluated by ENT - with confirmed Dx GERD and no vocal cord abnormalities    Hypokalemia.    - sec to gastroeneritis- resolved    Calf tenderness.    - right calf tenderness   - Nathanael's sign neg  - Wells score for DVT 1; low/unlikley hence will not order further tests.     Chronic pain,   patient reports taking only Tylenol and Motrin as needed for pain   - will hold Motrin  - will continue methocarbamol and gabapentin   - will continue Tylenol.     IMPROVE VTE score is 0   - DVT ppx as patient might be bedridden during hospital stay.    Stabilized in ICU and downgraded to medicine floor with bipap at night.  Solumedrol tapered, continued bronchodilators and Bipap at night discontinued as patient tolerated well room air as per attending Dr Cruz and pulmonology Dr Georges.  Nehemias was seen and evaluated by speech and swallow with barium swallow studies performed and evaluated by Gastroenterologist Dr Qiu with no furher testing during hospitalization but out-patient follow up for Gastritis/GERD management    Case with discharge plan discussed with Pulmonologist, gastroenterologist and attending Dr Cruz 51 year old female from home, with history of Asthma with multiple hospitalizations including intubation for 24 hours last year and Allergies, Chronic back pain, PSH of appendectomy, right hip surgery and hysterectomy, presented with acute onset of shortness of breath and wheezing.     Patient had moderate persistent asthma since age 10 years requiring 1-2 puffs of inhalers a day and no night time symptoms. Two years ago she was admitted for anaphylactic shock and discovered to have several allergies to meat, nuts, eggs, shell fish, etc. Also her Asthma became severe persistent with frequent night time symptoms and multiple hospital admissions. She was placed on and off on intravenous steroids. Last year intubation at [facility] was "difficult as her throat was closing and she was extubated next day", was told "she cannot be intubated again should have tracheostomy" if invasive ventilation is needed. Two months ago had PFTs at [facility] and found to have an "upper airway condition "something in throat'. She was tapered off steroids about 2 months ago and inhaled steroids was recommended. Despite PMD's efforts in getting insurance to pay, she couldn't get the same and has been on inhalers and nebulisers only.  Two days ago she noticed windows of her room were open and she could smell fresh cut grass that initiated the asthma attack. Also has cough, shortness of breath wheeze, sputum (clear), feeling warm/no fever, headache, nasal congestion and left sided pleuritic chest pain. She was at her orthopedist's office from where she was sent to ED for SOB and wheeze.   She was having "greenish stools" for which PMD told her to take low fibre. Since yesterday she has been having nausea, vomiting and diarrhea. Describes vomitus as anything that she eats and sometimes "clear color with streaks of blood". She had 3 watery BMs yesterday. Denies any abdominal pain, weight loss, recent sick contact/travel or urinary problems. Due to allergies she cannot get Flu shot.   Lifetime non smoker, no passive smoking.     In the ED, she was given continuous albuterol nebs, magnesium, ketamine, and placed on BIPAP with significant improvement of symptoms.     She was admited to ICU:  Severe persistent asthma with acute exacerbation, secondary to seasonal allergy   - s/p Duonebs*3, ketamine 80 once, mag sulphate 2mg iv once and BiPAP with I/E 12/6. ABG 7.44/28/249/19  - Patient's peak flow at exam was 430ml. Her baseline is around 300ml.   - will admit to ICU and new Bipap was needed   - will continue Duonebs, solumedrol, Symbicort and Singulair   - patient saturated 99% with 2l NC;   - viral panel negative  - bcx negative  - out patient PFTs indicate that patient developed stridor after methacholine inhalation during fifth stage of test and was subsequently transferred to ED. Otherwise, PFTs and flow cytometry is normal; most likely diagnosis is vocal cord dysfunction.- secondary to seasonal allergy     Gastroenteritis, most likely viral gastroenteritis  - she received only on antibiotic 2 months ago during a hospital visit; no abdominal tenderness hence will not get order C diff   iv fluids and electrolyte replacement, later discontinued fluids, tolerating diet  patient evaluated by ENT - with confirmed Dx GERD and no vocal cord abnormalities    Hypokalemia.    - sec to gastroeneritis- resolved    Calf tenderness.    - right calf tenderness   - Nathanael's sign neg  - Wells score for DVT 1; low/unlikley hence will not order further tests.     Chronic pain,   patient reports taking only Tylenol and Motrin as needed for pain   - will hold Motrin  - will continue methocarbamol and gabapentin   - will continue Tylenol.     IMPROVE VTE score is 0   - DVT ppx as patient might be bedridden during hospital stay.    Stabilized in ICU and downgraded to medicine floor with bipap at night.  Solumedrol tapered, continued bronchodilators and Bipap at night discontinued as patient tolerated well room air as per attending Dr Cruz and pulmonology Dr Georges.  Nehemias was seen and evaluated by speech and swallow with barium swallow studies performed and evaluated by Gastroenterologist Dr Qiu with no furher testing during hospitalization but out-patient follow up for Gastritis/GERD management    Case with discharge plan discussed with pulmonologist, gastroenterologist and attending Dr Cruz

## 2025-03-11 NOTE — ED ADULT NURSE NOTE - CAS DISCH ACCOMP BY
Central Prior Authorization Team   Phone: [phone]    PA Initiation    Medication: LORazepam 0.5MG tablets  Insurance Company: [employer] - Phone [phone] Fax [phone]  Pharmacy Filling the Rx: [id number] - [place], [place] - [address] [place] [address]  Filling Pharmacy Phone: [phone]  Filling Pharmacy Fax:    Start Date: 3/11/2025    [facility] KEY: [id number]       MD/Transporter/RN